# Patient Record
Sex: MALE | Race: WHITE | NOT HISPANIC OR LATINO | Employment: FULL TIME | URBAN - METROPOLITAN AREA
[De-identification: names, ages, dates, MRNs, and addresses within clinical notes are randomized per-mention and may not be internally consistent; named-entity substitution may affect disease eponyms.]

---
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
## 2023-10-08 ENCOUNTER — HEALTH MAINTENANCE LETTER (OUTPATIENT)
Age: 18
End: 2023-10-08

## 2024-04-10 ENCOUNTER — OFFICE VISIT (OUTPATIENT)
Dept: FAMILY MEDICINE | Facility: CLINIC | Age: 19
End: 2024-04-10
Payer: COMMERCIAL

## 2024-04-10 VITALS
BODY MASS INDEX: 33.9 KG/M2 | HEIGHT: 67 IN | DIASTOLIC BLOOD PRESSURE: 80 MMHG | OXYGEN SATURATION: 98 % | RESPIRATION RATE: 16 BRPM | HEART RATE: 72 BPM | WEIGHT: 216 LBS | TEMPERATURE: 98.9 F | SYSTOLIC BLOOD PRESSURE: 120 MMHG

## 2024-04-10 DIAGNOSIS — Z79.899 TRANSGENDER MAN ON HORMONE THERAPY: Primary | ICD-10-CM

## 2024-04-10 DIAGNOSIS — F64.0 TRANSGENDER MAN ON HORMONE THERAPY: Primary | ICD-10-CM

## 2024-04-10 DIAGNOSIS — F41.9 ANXIETY: ICD-10-CM

## 2024-04-10 DIAGNOSIS — K21.00 GASTROESOPHAGEAL REFLUX DISEASE WITH ESOPHAGITIS, UNSPECIFIED WHETHER HEMORRHAGE: ICD-10-CM

## 2024-04-10 PROCEDURE — 90715 TDAP VACCINE 7 YRS/> IM: CPT | Performed by: FAMILY MEDICINE

## 2024-04-10 PROCEDURE — 90651 9VHPV VACCINE 2/3 DOSE IM: CPT | Performed by: FAMILY MEDICINE

## 2024-04-10 PROCEDURE — 90471 IMMUNIZATION ADMIN: CPT | Performed by: FAMILY MEDICINE

## 2024-04-10 PROCEDURE — 90472 IMMUNIZATION ADMIN EACH ADD: CPT | Performed by: FAMILY MEDICINE

## 2024-04-10 PROCEDURE — 90746 HEPB VACCINE 3 DOSE ADULT IM: CPT | Performed by: FAMILY MEDICINE

## 2024-04-10 PROCEDURE — 99204 OFFICE O/P NEW MOD 45 MIN: CPT | Mod: 25 | Performed by: FAMILY MEDICINE

## 2024-04-10 RX ORDER — ESCITALOPRAM OXALATE 10 MG/1
10 TABLET ORAL DAILY
Qty: 30 TABLET | Refills: 2 | Status: SHIPPED | OUTPATIENT
Start: 2024-04-10 | End: 2024-08-19

## 2024-04-10 ASSESSMENT — ANXIETY QUESTIONNAIRES
8. IF YOU CHECKED OFF ANY PROBLEMS, HOW DIFFICULT HAVE THESE MADE IT FOR YOU TO DO YOUR WORK, TAKE CARE OF THINGS AT HOME, OR GET ALONG WITH OTHER PEOPLE?: VERY DIFFICULT
2. NOT BEING ABLE TO STOP OR CONTROL WORRYING: NEARLY EVERY DAY
6. BECOMING EASILY ANNOYED OR IRRITABLE: NEARLY EVERY DAY
7. FEELING AFRAID AS IF SOMETHING AWFUL MIGHT HAPPEN: SEVERAL DAYS
GAD7 TOTAL SCORE: 19
GAD7 TOTAL SCORE: 19
7. FEELING AFRAID AS IF SOMETHING AWFUL MIGHT HAPPEN: SEVERAL DAYS
5. BEING SO RESTLESS THAT IT IS HARD TO SIT STILL: NEARLY EVERY DAY
4. TROUBLE RELAXING: NEARLY EVERY DAY
3. WORRYING TOO MUCH ABOUT DIFFERENT THINGS: NEARLY EVERY DAY
IF YOU CHECKED OFF ANY PROBLEMS ON THIS QUESTIONNAIRE, HOW DIFFICULT HAVE THESE PROBLEMS MADE IT FOR YOU TO DO YOUR WORK, TAKE CARE OF THINGS AT HOME, OR GET ALONG WITH OTHER PEOPLE: VERY DIFFICULT
GAD7 TOTAL SCORE: 19
1. FEELING NERVOUS, ANXIOUS, OR ON EDGE: NEARLY EVERY DAY

## 2024-04-10 ASSESSMENT — PAIN SCALES - GENERAL: PAINLEVEL: NO PAIN (1)

## 2024-04-10 ASSESSMENT — PATIENT HEALTH QUESTIONNAIRE - PHQ9
SUM OF ALL RESPONSES TO PHQ QUESTIONS 1-9: 23
SUM OF ALL RESPONSES TO PHQ QUESTIONS 1-9: 23
10. IF YOU CHECKED OFF ANY PROBLEMS, HOW DIFFICULT HAVE THESE PROBLEMS MADE IT FOR YOU TO DO YOUR WORK, TAKE CARE OF THINGS AT HOME, OR GET ALONG WITH OTHER PEOPLE: EXTREMELY DIFFICULT

## 2024-04-10 NOTE — PROGRESS NOTES
"S ;Jose Washington is a 19 year old male with anxiety.  Worse recently.  Abd pain, nausea, legs hurt, stressed.  Not on meds, never has been.      Can't eat without nausea.  Worse recently.  No vomiting.      No fever, no r alba, not ill    Sleeping more.  No alcohol or drug use    Some gerd, burning with eating, not a lot.  Not on antacids    Transgender man: on testosterone since 12.  Has had breasts removed    O:/80   Pulse 72   Temp 98.9  F (37.2  C) (Tympanic)   Resp 16   Ht 1.702 m (5' 7\")   Wt 98 kg (216 lb)   SpO2 98%   BMI 33.83 kg/m    GEN: Alert and oriented, in no acute distress  CV: RRR, no murmur  RESP: lungs clear bilaterally, good effort  ABD: nontender.  No distention or mass appreciated.  EXT: no edema or lesions noted in lower extremities    A: transgender man, ongoing hormone therapy       Gerd, mild/moderate         Anxiety, severe    P: is getting into therapy.  Willing/wanting to start meds.  Lexapro 10mg.  Back in4-6 wks    Prilosec for gerd.  May be related to anxiety, but will cover sx at this point.    Continue testosterone    Self cares discussed.  Hobbies, diet/ exercise, social connection, sleep.  He agrees        "

## 2024-07-24 ENCOUNTER — TELEPHONE (OUTPATIENT)
Dept: FAMILY MEDICINE | Facility: CLINIC | Age: 19
End: 2024-07-24
Payer: COMMERCIAL

## 2024-07-24 NOTE — TELEPHONE ENCOUNTER
"RN COVID TREATMENT VISIT  07/24/24       The patient has been triaged and does not require a higher level of care.    Jose Washington  19 year old  Current weight? 216 lb    Has the patient been seen by a primary care provider at an [facility] or [facility] within the past two years? Yes.  Pt says Dr Meeks [facility], is PCP but that pt wants to establish at [facility] in future.  Have you been in close proximity to/do you have a known exposure to a person with a confirmed case of influenza? No.     General treatment eligibility:  Date of positive COVID test (PCR or at home)?  7/22/2024    Are you or have you been hospitalized for this COVID-19 infection? No.   Have you received monoclonal antibodies or antiviral treatment for COVID-19 since this positive test? No.   Do you have any of the following conditions that place you at risk of being very sick from COVID-19?   No high risk conditions. Patient informed they do not qualify for treatment in RN protocol    Onset of symptoms was 6 days ago on 7/18/24.    Pt reports that pt has had Covid 5 times before but is worried because pt feels weaker, brain fog, confusion, and not had these symptoms before.  Pt says if walks across the room, needs to stop and rest.  Pt says is concerned because this time the illness feels \"a lot worse\" than prior Covid illnesses.  Pt says heart feels like it is beating fast and has slight chest pain.    Pt reports drinking plenty of fluids.    Pt wants to be evaluated in person in urgent care or clinic today.    Pt intends to present in urgent care today.  Reminded pt to mask upon arrival and notify staff of positive Covid test.  Pt agrees.    Ese Glover RN       "

## 2024-08-19 ENCOUNTER — HOSPITAL ENCOUNTER (OUTPATIENT)
Dept: GENERAL RADIOLOGY | Facility: CLINIC | Age: 19
Discharge: HOME | End: 2024-08-19
Attending: NURSE PRACTITIONER | Admitting: NURSE PRACTITIONER
Payer: COMMERCIAL

## 2024-08-19 ENCOUNTER — OFFICE VISIT (OUTPATIENT)
Dept: FAMILY MEDICINE | Facility: CLINIC | Age: 19
End: 2024-08-19
Payer: COMMERCIAL

## 2024-08-19 VITALS
RESPIRATION RATE: 20 BRPM | BODY MASS INDEX: 35.63 KG/M2 | OXYGEN SATURATION: 100 % | SYSTOLIC BLOOD PRESSURE: 128 MMHG | DIASTOLIC BLOOD PRESSURE: 81 MMHG | HEIGHT: 67 IN | WEIGHT: 227 LBS | HEART RATE: 99 BPM | TEMPERATURE: 98.9 F

## 2024-08-19 DIAGNOSIS — R25.1 SHAKINESS: ICD-10-CM

## 2024-08-19 DIAGNOSIS — R41.89 BRAIN FOG: ICD-10-CM

## 2024-08-19 DIAGNOSIS — R06.02 SOB (SHORTNESS OF BREATH) ON EXERTION: ICD-10-CM

## 2024-08-19 DIAGNOSIS — F33.0 MILD EPISODE OF RECURRENT MAJOR DEPRESSIVE DISORDER (H): Primary | ICD-10-CM

## 2024-08-19 DIAGNOSIS — F41.1 GENERALIZED ANXIETY DISORDER: ICD-10-CM

## 2024-08-19 LAB
ALBUMIN SERPL BCG-MCNC: 4.7 G/DL (ref 3.5–5.2)
ALBUMIN UR-MCNC: NEGATIVE MG/DL
ALP SERPL-CCNC: 76 U/L (ref 40–150)
ALT SERPL W P-5'-P-CCNC: 15 U/L (ref 0–70)
AMORPH CRY #/AREA URNS HPF: ABNORMAL /HPF
ANION GAP SERPL CALCULATED.3IONS-SCNC: 11 MMOL/L (ref 7–15)
APPEARANCE UR: ABNORMAL
AST SERPL W P-5'-P-CCNC: 21 U/L (ref 0–45)
BACTERIA #/AREA URNS HPF: ABNORMAL /HPF
BILIRUB SERPL-MCNC: 0.4 MG/DL
BILIRUB UR QL STRIP: NEGATIVE
BUN SERPL-MCNC: 10.7 MG/DL (ref 6–20)
CALCIUM SERPL-MCNC: 9.5 MG/DL (ref 8.8–10.4)
CHLORIDE SERPL-SCNC: 103 MMOL/L (ref 98–107)
COLOR UR AUTO: YELLOW
CREAT SERPL-MCNC: 0.83 MG/DL (ref 0.67–1.17)
EGFRCR SERPLBLD CKD-EPI 2021: >90 ML/MIN/1.73M2
ERYTHROCYTE [DISTWIDTH] IN BLOOD BY AUTOMATED COUNT: 12 % (ref 10–15)
GLUCOSE SERPL-MCNC: 85 MG/DL (ref 70–99)
GLUCOSE UR STRIP-MCNC: NEGATIVE MG/DL
HCO3 SERPL-SCNC: 26 MMOL/L (ref 22–29)
HCT VFR BLD AUTO: 40.2 % (ref 40–53)
HGB BLD-MCNC: 13.2 G/DL (ref 13.3–17.7)
HGB UR QL STRIP: ABNORMAL
KETONES UR STRIP-MCNC: NEGATIVE MG/DL
LEUKOCYTE ESTERASE UR QL STRIP: NEGATIVE
MCH RBC QN AUTO: 28.5 PG (ref 26.5–33)
MCHC RBC AUTO-ENTMCNC: 32.8 G/DL (ref 31.5–36.5)
MCV RBC AUTO: 87 FL (ref 78–100)
MONOCYTES NFR BLD AUTO: NEGATIVE %
MUCOUS THREADS #/AREA URNS LPF: PRESENT /LPF
NITRATE UR QL: NEGATIVE
PH UR STRIP: 7.5 [PH] (ref 5–7)
PLATELET # BLD AUTO: 324 10E3/UL (ref 150–450)
POTASSIUM SERPL-SCNC: 3.8 MMOL/L (ref 3.4–5.3)
PROT SERPL-MCNC: 7.7 G/DL (ref 6.4–8.3)
RBC # BLD AUTO: 4.63 10E6/UL (ref 4.4–5.9)
RBC #/AREA URNS AUTO: ABNORMAL /HPF
SODIUM SERPL-SCNC: 140 MMOL/L (ref 135–145)
SP GR UR STRIP: 1.01 (ref 1–1.03)
SQUAMOUS #/AREA URNS AUTO: ABNORMAL /LPF
TSH SERPL DL<=0.005 MIU/L-ACNC: 1.27 UIU/ML (ref 0.3–4.2)
UROBILINOGEN UR STRIP-ACNC: 0.2 E.U./DL
WBC # BLD AUTO: 9.1 10E3/UL (ref 4–11)
WBC #/AREA URNS AUTO: ABNORMAL /HPF

## 2024-08-19 PROCEDURE — 84443 ASSAY THYROID STIM HORMONE: CPT | Performed by: NURSE PRACTITIONER

## 2024-08-19 PROCEDURE — 86308 HETEROPHILE ANTIBODY SCREEN: CPT | Performed by: NURSE PRACTITIONER

## 2024-08-19 PROCEDURE — 96127 BRIEF EMOTIONAL/BEHAV ASSMT: CPT | Performed by: NURSE PRACTITIONER

## 2024-08-19 PROCEDURE — 36415 COLL VENOUS BLD VENIPUNCTURE: CPT | Performed by: NURSE PRACTITIONER

## 2024-08-19 PROCEDURE — 81001 URINALYSIS AUTO W/SCOPE: CPT | Performed by: NURSE PRACTITIONER

## 2024-08-19 PROCEDURE — 71046 X-RAY EXAM CHEST 2 VIEWS: CPT

## 2024-08-19 PROCEDURE — 85027 COMPLETE CBC AUTOMATED: CPT | Performed by: NURSE PRACTITIONER

## 2024-08-19 PROCEDURE — 80053 COMPREHEN METABOLIC PANEL: CPT | Performed by: NURSE PRACTITIONER

## 2024-08-19 PROCEDURE — 86618 LYME DISEASE ANTIBODY: CPT | Performed by: NURSE PRACTITIONER

## 2024-08-19 PROCEDURE — G2211 COMPLEX E/M VISIT ADD ON: HCPCS | Performed by: NURSE PRACTITIONER

## 2024-08-19 PROCEDURE — 93000 ELECTROCARDIOGRAM COMPLETE: CPT | Performed by: NURSE PRACTITIONER

## 2024-08-19 PROCEDURE — 99214 OFFICE O/P EST MOD 30 MIN: CPT | Performed by: NURSE PRACTITIONER

## 2024-08-19 RX ORDER — ESCITALOPRAM OXALATE 20 MG/1
20 TABLET ORAL DAILY
Qty: 90 TABLET | Refills: 1 | Status: SHIPPED | OUTPATIENT
Start: 2024-08-19

## 2024-08-19 RX ORDER — TESTOSTERONE ENANTHATE 75 MG/.5ML
75 INJECTION SUBCUTANEOUS WEEKLY
COMMUNITY
Start: 2023-10-02

## 2024-08-19 ASSESSMENT — PATIENT HEALTH QUESTIONNAIRE - PHQ9
SUM OF ALL RESPONSES TO PHQ QUESTIONS 1-9: 10
5. POOR APPETITE OR OVEREATING: SEVERAL DAYS

## 2024-08-19 ASSESSMENT — PAIN SCALES - GENERAL: PAINLEVEL: MODERATE PAIN (5)

## 2024-08-19 ASSESSMENT — ANXIETY QUESTIONNAIRES
7. FEELING AFRAID AS IF SOMETHING AWFUL MIGHT HAPPEN: NOT AT ALL
6. BECOMING EASILY ANNOYED OR IRRITABLE: SEVERAL DAYS
GAD7 TOTAL SCORE: 3
3. WORRYING TOO MUCH ABOUT DIFFERENT THINGS: NOT AT ALL
1. FEELING NERVOUS, ANXIOUS, OR ON EDGE: SEVERAL DAYS
5. BEING SO RESTLESS THAT IT IS HARD TO SIT STILL: NOT AT ALL
2. NOT BEING ABLE TO STOP OR CONTROL WORRYING: NOT AT ALL
GAD7 TOTAL SCORE: 3

## 2024-08-19 ASSESSMENT — ENCOUNTER SYMPTOMS: FATIGUE: 1

## 2024-08-19 NOTE — PATIENT INSTRUCTIONS
I increased your lexapro to 20 mg.  Follow-up in 6 months if things seem to improve for recheck or sooner if still having mood issues with depression and anxiety.  Labs today.  EKG today.  Chest XR today.  I will notify you of Chest XR and lab results when the reports are back.  If this is all normal, I will refer you to [facility].

## 2024-08-19 NOTE — PROGRESS NOTES
Assessment & Plan     Mild episode of recurrent major depressive disorder (H24)  Due to still having some mild depression, will increase Lexapro to 20 mg.  I advised him to follow-up in 1 month or less if any side effects or not seeing improvement.  Otherwise I refilled this out for 6 months and recommend follow-up  - escitalopram (LEXAPRO) 20 MG tablet; Take 1 tablet (20 mg) by mouth daily    Generalized anxiety disorder  Currently stable.  - escitalopram (LEXAPRO) 20 MG tablet; Take 1 tablet (20 mg) by mouth daily    Brain fog  Uncertain if symptoms of shortness of breath, shakiness and brain fog are long-term COVID.  Will get labs today for CBC, TSH, CMP and urine as well as mononucleosis and Lyme's disease to rule out other causes.  If tests all come back negative would plan for referral to COVID long-haul her clinic for further evaluation.  I do not feel GI symptoms correlate to a long haulers diagnosis so I have recommend that he continue to follow-up with GI on the symptoms.  - CBC with platelets; Future  - TSH with free T4 reflex; Future  - Comprehensive metabolic panel (BMP + Alb, Alk Phos, ALT, AST, Total. Bili, TP); Future  - UA Macroscopic with reflex to Microscopic and Culture - Lab Collect; Future  - Mononucleosis screen; Future  - CBC with platelets  - TSH with free T4 reflex  - Comprehensive metabolic panel (BMP + Alb, Alk Phos, ALT, AST, Total. Bili, TP)  - UA Macroscopic with reflex to Microscopic and Culture - Lab Collect  - Mononucleosis screen    SOB (shortness of breath) on exertion  See note above.  EKG completed and normal.  - CBC with platelets; Future  - TSH with free T4 reflex; Future  - Comprehensive metabolic panel (BMP + Alb, Alk Phos, ALT, AST, Total. Bili, TP); Future  - XR Chest 2 Views; Future  - Mononucleosis screen; Future  - EKG 12-lead complete w/read - Clinics  - CBC with platelets  - TSH with free T4 reflex  - Comprehensive metabolic panel (BMP + Alb, Alk Phos, ALT, AST,  Total. Bili, TP)  - Mononucleosis screen    Shakiness  See note above.  - CBC with platelets; Future  - TSH with free T4 reflex; Future  - Comprehensive metabolic panel (BMP + Alb, Alk Phos, ALT, AST, Total. Bili, TP); Future  - Mononucleosis screen; Future  - CBC with platelets  - TSH with free T4 reflex  - Comprehensive metabolic panel (BMP + Alb, Alk Phos, ALT, AST, Total. Bili, TP)  - Mononucleosis screen        Depression Screening Follow Up        8/19/2024     2:23 PM   PHQ   PHQ-9 Total Score 10   Q9: Thoughts of better off dead/self-harm past 2 weeks Not at all         6/17/2021     4:00 PM 4/10/2024     7:19 AM 8/19/2024     2:23 PM   YAZMIN-7 SCORE   Total Score  19 (severe anxiety)    Total Score 2 19 3             Follow Up Actions Taken  Crisis resource information provided in After Visit Summary  Adjusted patient's anti-depressant medication.       See Patient Instructions    Jessa Garcia is a 19 year old, presenting for the following health issues:  Fatigue (For a year Following COVID/)        8/19/2024     2:11 PM   Additional Questions   Roomed by Rae MCCRARY LPN   Accompanied by Self     History of Present Illness       Reason for visit:  Variety of symptoms, establish care  Symptom onset:  More than a month  Symptoms include:  Shaking legs, overall weakness and fatigue,shortness of breath  Symptom intensity:  Severe  Symptom progression:  Worsening  Had these symptoms before:  Yes  Has tried/received treatment for these symptoms:  No  What makes it worse:  Working, most activites  What makes it better:  Lots of rest    He eats 2-3 servings of fruits and vegetables daily.He consumes 2 sweetened beverage(s) daily.He exercises with enough effort to increase his heart rate 20 to 29 minutes per day.  He exercises with enough effort to increase his heart rate 3 or less days per week.   He is taking medications regularly.     Patient states that he has had COVID about 5 times.  He did get the first 2  vaccinations but not any boosters.  This all started about 2 years ago and every time he gets COVID his symptoms seem to be progressively worse or hang around longer.  He states that he usually bounces back after couple weeks but since July he feels that his symptoms are not bouncing back.  He feels shaky especially when laying in bed.  He is get some brain fog and it is hard to focus.  He does have some shortness of breath with exertion sometimes but states that it is not always.  He has also had some stomach issues that have been followed up on and he was put on Prilosec which has not improved the symptoms.  He describes the symptoms as nausea with very little vomiting and feeling like it is hard to eat full meals and has to eat small amounts more often.  He will get occasional heartburn and has been on a PPI medication but this did not help.  He has tried gluten and dairy free for 2 weeks and this did not help the symptoms.  He is followed up with gastroenterology and had EGD completed which was normal.    Patient did see one of the providers in our breast clinic and he was started on Lexapro.  I repeated YAZMIN-7 and PHQ-9 today.  Anxiety does seem to be under control, however he does still have some mild depression.  He is currently on Lexapro 10 mg.    He also is transgender and is on hormone therapy testosterone.  He is tolerating this fine and is followed by specialist for this treatment plan.  His testosterone levels have been within normal expected range.        Review of Systems  CONSTITUTIONAL:POSITIVE  for intermittent shakiness, brain fog  RESP: NEGATIVE for significant cough, positive for intermittent shortness of breath  CV: NEGATIVE for chest pain, palpitations or peripheral edema  GI: Positive for nausea, rare vomiting, early satiety and heartburn, following with GI.  PSYCHIATRIC: POSITIVE for improved anxiety on Lexapro 10 mg with continued depression.  ROS otherwise negative      Objective    BP  "128/81   Pulse 99   Temp 98.9  F (37.2  C) (Tympanic)   Resp 20   Ht 1.702 m (5' 7\")   Wt 103 kg (227 lb)   SpO2 100%   BMI 35.55 kg/m    Body mass index is 35.55 kg/m .  Physical Exam   GENERAL: alert and no distress  RESP: lungs clear to auscultation - no rales, rhonchi or wheezes  CV: regular rate and rhythm, normal S1 S2, no S3 or S4, no murmur, click or rub, no peripheral edema  ABDOMEN: soft, nontender, no hepatosplenomegaly, no masses and bowel sounds normal  PSYCH: mentation appears normal, affect normal/bright  PSYCH: mentation appears normal and affect normal/bright    Signed Electronically by: Vani Leiva NP    "

## 2024-08-20 DIAGNOSIS — U09.9 COVID-19 LONG HAULER: Primary | ICD-10-CM

## 2024-08-20 LAB — B BURGDOR IGG+IGM SER QL: 0.17

## 2024-09-04 ENCOUNTER — VIRTUAL VISIT (OUTPATIENT)
Dept: PHYSICAL MEDICINE AND REHAB | Facility: CLINIC | Age: 19
End: 2024-09-04
Attending: NURSE PRACTITIONER
Payer: COMMERCIAL

## 2024-09-04 VITALS — BODY MASS INDEX: 34.55 KG/M2 | WEIGHT: 215 LBS | HEIGHT: 66 IN

## 2024-09-04 DIAGNOSIS — U09.9 COVID-19 LONG HAULER: ICD-10-CM

## 2024-09-04 DIAGNOSIS — F64.0 TRANSGENDER MAN ON HORMONE THERAPY: ICD-10-CM

## 2024-09-04 DIAGNOSIS — Z79.899 TRANSGENDER MAN ON HORMONE THERAPY: ICD-10-CM

## 2024-09-04 DIAGNOSIS — F41.9 ANXIETY: Primary | ICD-10-CM

## 2024-09-04 DIAGNOSIS — F32.1 CURRENT MODERATE EPISODE OF MAJOR DEPRESSIVE DISORDER, UNSPECIFIED WHETHER RECURRENT (H): ICD-10-CM

## 2024-09-04 PROCEDURE — 99204 OFFICE O/P NEW MOD 45 MIN: CPT | Mod: 95 | Performed by: PHYSICAL MEDICINE & REHABILITATION

## 2024-09-04 RX ORDER — BUPROPION HYDROCHLORIDE 150 MG/1
150 TABLET ORAL EVERY MORNING
Qty: 30 TABLET | Refills: 3 | Status: SHIPPED | OUTPATIENT
Start: 2024-09-04

## 2024-09-04 SDOH — SOCIAL STABILITY: SOCIAL NETWORK: I HAVE TROUBLE DOING ALL OF THE ACTIVITIES WITH FRIENDS THAT I WANT TO DO: ALWAYS

## 2024-09-04 SDOH — SOCIAL STABILITY: SOCIAL NETWORK: I HAVE TROUBLE DOING ALL OF THE FAMILY ACTIVITIES THAT I WANT TO DO: ALWAYS

## 2024-09-04 SDOH — SOCIAL STABILITY: SOCIAL NETWORK

## 2024-09-04 SDOH — SOCIAL STABILITY: SOCIAL NETWORK: I HAVE TROUBLE DOING ALL OF MY REGULAR LEISURE ACTIVITIES WITH OTHERS: ALWAYS

## 2024-09-04 SDOH — SOCIAL STABILITY: SOCIAL NETWORK: I HAVE TROUBLE DOING ALL OF MY USUAL WORK (INCLUDE WORK AT HOME): ALWAYS

## 2024-09-04 SDOH — SOCIAL STABILITY: SOCIAL NETWORK: PROMIS ABILITY TO PARTICIPATE IN SOCIAL ROLES & ACTIVITIES T-SCORE: 29

## 2024-09-04 ASSESSMENT — PATIENT HEALTH QUESTIONNAIRE - PHQ9
SUM OF ALL RESPONSES TO PHQ QUESTIONS 1-9: 10
SUM OF ALL RESPONSES TO PHQ QUESTIONS 1-9: 10
10. IF YOU CHECKED OFF ANY PROBLEMS, HOW DIFFICULT HAVE THESE PROBLEMS MADE IT FOR YOU TO DO YOUR WORK, TAKE CARE OF THINGS AT HOME, OR GET ALONG WITH OTHER PEOPLE: VERY DIFFICULT

## 2024-09-04 ASSESSMENT — ANXIETY QUESTIONNAIRES
GAD7 TOTAL SCORE: 8
7. FEELING AFRAID AS IF SOMETHING AWFUL MIGHT HAPPEN: NOT AT ALL
GAD7 TOTAL SCORE: 8
GAD7 TOTAL SCORE: 8
8. IF YOU CHECKED OFF ANY PROBLEMS, HOW DIFFICULT HAVE THESE MADE IT FOR YOU TO DO YOUR WORK, TAKE CARE OF THINGS AT HOME, OR GET ALONG WITH OTHER PEOPLE?: VERY DIFFICULT

## 2024-09-04 ASSESSMENT — PAIN SCALES - GENERAL: PAINLEVEL: MODERATE PAIN (4)

## 2024-09-04 NOTE — LETTER
9/4/2024       RE: Jose Washington  [address]  [place] [address]     Dear Colleague,    Thank you for referring your patient, Jose Washington, to the [facility] [place] [facility]. Please see a copy of my visit note below.    Jose is a 19 year old who is being evaluated via a billable video visit.    How would you like to obtain your AVS? MyChart  If the video visit is dropped, the invitation should be resent by: Send to e-mail at: [email or web address]  Will anyone else be joining your video visit? Notoday, to evaluate inflammatory levels.       Assessment and plan   Jose Washington is a 19 year old with history of LC since March 2022, presents with symptoms of fatigue, tachycardia, and SOB.   Additionally he has brain fog.     Send lab work today, to evaluate inflammatory levels.     Mental health;   Notes depression and anxiety not well controlled. Refer to psychiatry. Continue lexapro but will add Wellbutrin Prescription sent. He was also referred to psychiatry and psychology.     Consider guanfacine and NAC following blood work results.     Counseled patient on Long COVID syndrome, symptoms and treatment options. Will see the patient in 4 weeks to review the results of the blood work.     Serena Coronel MD, [facility]   Department of Rehabilitation         Subjective  Jose is a 19 year old, presenting for the following health issues:  Consult  Consult    HPI   History of COVID-19 infection: 6-7 times since the pandemic, last one in July 2024   Date of first symptoms:   He has had LC symptoms since March 2022.   Diagnosis: PCR  Hospitalization: No  Treatment: none   PMH;   Transgender man: on testosterone since 12. Has had breasts removed   Anxiety     Current Symptoms:  Fatigue; 7-8/10 grades, lower range is 2/10. Every activity makes the fatigue worse.      SOB; worsened by minimal exertion.      Tachycardia;  he has palpitations, and is not associated with activity.     He is on lexapro at 20 mg daily for depression and anxiety.     He is a  at [employer].     Labs reviewed   Hemoglobin is somewhat low for his age, and MCV is towards the lower end of the norm.   Lyme disease antibody serum is negative     CHEST TWO VIEWS 8/19/2024 3:15 PM   HISTORY: SOB (shortness of breath) on exertion  COMPARISON: None.                                                                IMPRESSION: The cardiac silhouette is within normal limits. No  evidence for infiltrate or effusion. No significant bony  abnormalities.        9/4/2024     8:32 AM   PHQ Assesment Total Score(s)   PHQ-9 Score 10           9/4/2024     8:33 AM   YAZMIN-7 Results   YAZMIN 7 TOTAL SCORE 8 (mild anxiety)   YAZMIN-7 Total Score 8         9/4/2024     8:33 AM   PTSD Screen Score   Have you ever experienced this kind of event? No         9/4/2024     8:35 AM   PROMIS-29   PROMIS Physical Function T-Score 36 (moderate dysfunction)   PROMIS Anxiety T-Score 60 (mild)   PROMIS Depression T-Score 56 (mild)   PROMIS Fatigue T-Score 76 (severe)   PROMIS Sleep Disturbance T-Score 54 (within normal limits)   PROMIS Ability to Participate in Social Roles & Activities T-Score 29 (severe dysfunction)   PROMIS Pain Interference T-Score 76 (severe)   PROMIS Pain Intensity 6           Social History     Tobacco Use     Smoking status: Never     Smokeless tobacco: Never   Vaping Use     Vaping status: Never Used         Current Outpatient Medications:      escitalopram (LEXAPRO) 20 MG tablet, Take 1 tablet (20 mg) by mouth daily, Disp: 90 tablet, Rfl: 1     XYOSTED 75 MG/0.5ML SOAJ, Inject 75 mg as directed once a week, Disp: , Rfl:       Review of Systems  Constitutional, HEENT, cardiovascular, pulmonary, gi and gu systems are negative, except as otherwise noted.      Objective   Vitals - Patient Reported  Pain Score: Moderate Pain (4)  Pain Loc:  (lower  body)        Physical Exam   GENERAL: alert and no distress  EYES: Eyes grossly normal to inspection.  No discharge or erythema, or obvious scleral/conjunctival abnormalities.  RESP: No audible wheeze, cough, or visible cyanosis.    SKIN: Visible skin clear. No significant rash, abnormal pigmentation or lesions.  NEURO: Cranial nerves grossly intact.  Mentation and speech appropriate for age.  PSYCH: Appropriate affect, tone, and pace of words          Video-Visit Details    Type of service:  Video Visit   Originating Location (pt. Location): Home    Distant Location (provider location):  Off-site  Platform used for Video Visit: [facility]  Signed Electronically by: Serena Coronel MD    Answers submitted by the patient for this visit:  Patient Health Questionnaire (Submitted on 9/4/2024)  If you checked off any problems, how difficult have these problems made it for you to do your work, take care of things at home, or get along with other people?: Very difficult  PHQ9 TOTAL SCORE: 10  Patient Health Questionnaire (G7) (Submitted on 9/4/2024)  YAZMIN 7 TOTAL SCORE: 8      Again, thank you for allowing me to participate in the care of your patient.      Sincerely,    Serena Coronel MD

## 2024-09-04 NOTE — PROGRESS NOTES
Jose is a 19 year old who is being evaluated via a billable video visit.    How would you like to obtain your AVS? MyChart  If the video visit is dropped, the invitation should be resent by: Send to e-mail at: [email or web address]  Will anyone else be joining your video visit? Notoday, to evaluate inflammatory levels.       Assessment and plan   Jose Washington is a 19 year old with history of LC since March 2022, presents with symptoms of fatigue, tachycardia, and SOB.   Additionally he has brain fog.     Send lab work today, to evaluate inflammatory levels.     Mental health;   Notes depression and anxiety not well controlled. Refer to psychiatry. Continue lexapro but will add Wellbutrin Prescription sent. He was also referred to psychiatry and psychology.     Consider guanfacine and NAC following blood work results.     Counseled patient on Long COVID syndrome, symptoms and treatment options. Will see the patient in 4 weeks to review the results of the blood work.     Serena Coronel MD, [facility]   Department of Rehabilitation         Subjective   Jose is a 19 year old, presenting for the following health issues:  Consult  Consult    HPI   History of COVID-19 infection: 6-7 times since the pandemic, last one in July 2024   Date of first symptoms:   He has had LC symptoms since March 2022.   Diagnosis: PCR  Hospitalization: No  Treatment: none   PMH;   Transgender man: on testosterone since 12. Has had breasts removed   Anxiety     Current Symptoms:  Fatigue; 7-8/10 grades, lower range is 2/10. Every activity makes the fatigue worse.      SOB; worsened by minimal exertion.     Tachycardia;  he has palpitations, and is not associated with activity.     He is on lexapro at 20 mg daily for depression and anxiety.     He is a  at [employer].     Labs reviewed   Hemoglobin is somewhat low for his age, and MCV is towards the lower end of the norm.   Lyme disease antibody serum is negative     CHEST TWO  VIEWS 8/19/2024 3:15 PM   HISTORY: SOB (shortness of breath) on exertion  COMPARISON: None.                                                                IMPRESSION: The cardiac silhouette is within normal limits. No  evidence for infiltrate or effusion. No significant bony  abnormalities.        9/4/2024     8:32 AM   PHQ Assesment Total Score(s)   PHQ-9 Score 10           9/4/2024     8:33 AM   YAZMIN-7 Results   YAZMIN 7 TOTAL SCORE 8 (mild anxiety)   YAZMIN-7 Total Score 8         9/4/2024     8:33 AM   PTSD Screen Score   Have you ever experienced this kind of event? No         9/4/2024     8:35 AM   PROMIS-29   PROMIS Physical Function T-Score 36 (moderate dysfunction)   PROMIS Anxiety T-Score 60 (mild)   PROMIS Depression T-Score 56 (mild)   PROMIS Fatigue T-Score 76 (severe)   PROMIS Sleep Disturbance T-Score 54 (within normal limits)   PROMIS Ability to Participate in Social Roles & Activities T-Score 29 (severe dysfunction)   PROMIS Pain Interference T-Score 76 (severe)   PROMIS Pain Intensity 6           Social History     Tobacco Use    Smoking status: Never    Smokeless tobacco: Never   Vaping Use    Vaping status: Never Used         Current Outpatient Medications:     escitalopram (LEXAPRO) 20 MG tablet, Take 1 tablet (20 mg) by mouth daily, Disp: 90 tablet, Rfl: 1    XYOSTED 75 MG/0.5ML SOAJ, Inject 75 mg as directed once a week, Disp: , Rfl:       Review of Systems  Constitutional, HEENT, cardiovascular, pulmonary, gi and gu systems are negative, except as otherwise noted.      Objective    Vitals - Patient Reported  Pain Score: Moderate Pain (4)  Pain Loc:  (lower body)        Physical Exam   GENERAL: alert and no distress  EYES: Eyes grossly normal to inspection.  No discharge or erythema, or obvious scleral/conjunctival abnormalities.  RESP: No audible wheeze, cough, or visible cyanosis.    SKIN: Visible skin clear. No significant rash, abnormal pigmentation or lesions.  NEURO: Cranial nerves grossly  intact.  Mentation and speech appropriate for age.  PSYCH: Appropriate affect, tone, and pace of words          Video-Visit Details    Type of service:  Video Visit   Originating Location (pt. Location): Home    Distant Location (provider location):  Off-site  Platform used for Video Visit: Jose  Signed Electronically by: Serena Coronel MD    Answers submitted by the patient for this visit:  Patient Health Questionnaire (Submitted on 9/4/2024)  If you checked off any problems, how difficult have these problems made it for you to do your work, take care of things at home, or get along with other people?: Very difficult  PHQ9 TOTAL SCORE: 10  Patient Health Questionnaire (G7) (Submitted on 9/4/2024)  YAZMIN 7 TOTAL SCORE: 8

## 2024-09-04 NOTE — NURSING NOTE
Current patient location:  [place], [place]    Is the patient currently in the state of [place]? YES    Visit mode:VIDEO    If the visit is dropped, the patient can be reconnected by: VIDEO VISIT: Send to e-mail at: [email or web address]    Will anyone else be joining the visit? NO  (If patient encounters technical issues they should call [phone]    How would you like to obtain your AVS? MyChart    Are changes needed to the allergy or medication list? No    Are refills needed on medications prescribed by this physician? NO    Rooming Documentation:  Questionnaire(s) completed      Reason for visit: Consult    Rae Zhou VVF    Depression Response    Patient completed the PHQ-9 assessment for depression and scored >9? Yes  Question 9 on the PHQ-9 was positive for suicidality? No  Does patient have current mental health provider? No    Is this a virtual visit? Yes   Does patient have suicidal ideation (positive question 9)? No - offer to place Mental Health Referral.  Referral order pended    I personally notified the following: visit provider

## 2024-09-13 ENCOUNTER — APPOINTMENT (OUTPATIENT)
Dept: LAB | Facility: CLINIC | Age: 19
End: 2024-09-13
Payer: COMMERCIAL

## 2024-09-13 LAB
CRP SERPL-MCNC: 8.4 MG/L
ERYTHROCYTE [SEDIMENTATION RATE] IN BLOOD BY WESTERGREN METHOD: 8 MM/HR (ref 0–15)
FERRITIN SERPL-MCNC: 144 NG/ML (ref 31–409)
FOLATE SERPL-MCNC: 8.4 NG/ML (ref 4.6–34.8)
IRON BINDING CAPACITY (ROCHE): 349 UG/DL (ref 240–430)
IRON SATN MFR SERPL: 27 % (ref 15–46)
IRON SERPL-MCNC: 94 UG/DL (ref 61–157)
LDH SERPL L TO P-CCNC: 168 U/L (ref 0–250)
VIT B12 SERPL-MCNC: 465 PG/ML (ref 232–1245)

## 2024-09-13 PROCEDURE — 83615 LACTATE (LD) (LDH) ENZYME: CPT | Performed by: PHYSICAL MEDICINE & REHABILITATION

## 2024-09-13 PROCEDURE — 36415 COLL VENOUS BLD VENIPUNCTURE: CPT | Performed by: PHYSICAL MEDICINE & REHABILITATION

## 2024-09-13 PROCEDURE — 83550 IRON BINDING TEST: CPT | Performed by: PHYSICAL MEDICINE & REHABILITATION

## 2024-09-13 PROCEDURE — 82746 ASSAY OF FOLIC ACID SERUM: CPT | Performed by: PHYSICAL MEDICINE & REHABILITATION

## 2024-09-13 PROCEDURE — 85652 RBC SED RATE AUTOMATED: CPT | Performed by: PHYSICAL MEDICINE & REHABILITATION

## 2024-09-13 PROCEDURE — 82728 ASSAY OF FERRITIN: CPT | Performed by: PHYSICAL MEDICINE & REHABILITATION

## 2024-09-13 PROCEDURE — 99000 SPECIMEN HANDLING OFFICE-LAB: CPT | Mod: 95 | Performed by: PATHOLOGY

## 2024-09-13 PROCEDURE — 86140 C-REACTIVE PROTEIN: CPT | Performed by: PHYSICAL MEDICINE & REHABILITATION

## 2024-09-13 PROCEDURE — 84425 ASSAY OF VITAMIN B-1: CPT | Mod: 90 | Performed by: PHYSICAL MEDICINE & REHABILITATION

## 2024-09-13 PROCEDURE — 82607 VITAMIN B-12: CPT | Performed by: PHYSICAL MEDICINE & REHABILITATION

## 2024-09-13 PROCEDURE — 84207 ASSAY OF VITAMIN B-6: CPT | Mod: 90 | Performed by: PHYSICAL MEDICINE & REHABILITATION

## 2024-09-17 LAB
PYRIDOXAL PHOS SERPL-SCNC: 84.3 NMOL/L
VIT B1 PYROPHOSHATE BLD-SCNC: 146 NMOL/L

## 2024-09-18 ENCOUNTER — TELEPHONE (OUTPATIENT)
Dept: PHYSICAL MEDICINE AND REHAB | Facility: CLINIC | Age: 19
End: 2024-09-18
Payer: COMMERCIAL

## 2024-10-02 ENCOUNTER — VIRTUAL VISIT (OUTPATIENT)
Dept: PHYSICAL MEDICINE AND REHAB | Facility: CLINIC | Age: 19
End: 2024-10-02
Payer: COMMERCIAL

## 2024-10-02 VITALS — WEIGHT: 220 LBS | BODY MASS INDEX: 35.51 KG/M2

## 2024-10-02 DIAGNOSIS — U09.9 COVID-19 LONG HAULER: Primary | ICD-10-CM

## 2024-10-02 PROCEDURE — 99214 OFFICE O/P EST MOD 30 MIN: CPT | Mod: 95 | Performed by: PHYSICAL MEDICINE & REHABILITATION

## 2024-10-02 RX ORDER — GUANFACINE 1 MG/1
1 TABLET ORAL AT BEDTIME
Qty: 30 TABLET | Refills: 3 | Status: SHIPPED | OUTPATIENT
Start: 2024-10-02

## 2024-10-02 SDOH — SOCIAL STABILITY: SOCIAL NETWORK: I HAVE TROUBLE DOING ALL OF MY REGULAR LEISURE ACTIVITIES WITH OTHERS: ALWAYS

## 2024-10-02 SDOH — SOCIAL STABILITY: SOCIAL NETWORK: I HAVE TROUBLE DOING ALL OF MY USUAL WORK (INCLUDE WORK AT HOME): ALWAYS

## 2024-10-02 SDOH — SOCIAL STABILITY: SOCIAL NETWORK: PROMIS ABILITY TO PARTICIPATE IN SOCIAL ROLES & ACTIVITIES T-SCORE: 29

## 2024-10-02 SDOH — SOCIAL STABILITY: SOCIAL NETWORK: I HAVE TROUBLE DOING ALL OF THE FAMILY ACTIVITIES THAT I WANT TO DO: ALWAYS

## 2024-10-02 SDOH — SOCIAL STABILITY: SOCIAL NETWORK

## 2024-10-02 SDOH — SOCIAL STABILITY: SOCIAL NETWORK: I HAVE TROUBLE DOING ALL OF THE ACTIVITIES WITH FRIENDS THAT I WANT TO DO: ALWAYS

## 2024-10-02 ASSESSMENT — ANXIETY QUESTIONNAIRES
8. IF YOU CHECKED OFF ANY PROBLEMS, HOW DIFFICULT HAVE THESE MADE IT FOR YOU TO DO YOUR WORK, TAKE CARE OF THINGS AT HOME, OR GET ALONG WITH OTHER PEOPLE?: VERY DIFFICULT
7. FEELING AFRAID AS IF SOMETHING AWFUL MIGHT HAPPEN: NOT AT ALL
2. NOT BEING ABLE TO STOP OR CONTROL WORRYING: NOT AT ALL
1. FEELING NERVOUS, ANXIOUS, OR ON EDGE: SEVERAL DAYS
GAD7 TOTAL SCORE: 9
4. TROUBLE RELAXING: NEARLY EVERY DAY
GAD7 TOTAL SCORE: 9
IF YOU CHECKED OFF ANY PROBLEMS ON THIS QUESTIONNAIRE, HOW DIFFICULT HAVE THESE PROBLEMS MADE IT FOR YOU TO DO YOUR WORK, TAKE CARE OF THINGS AT HOME, OR GET ALONG WITH OTHER PEOPLE: VERY DIFFICULT
5. BEING SO RESTLESS THAT IT IS HARD TO SIT STILL: NEARLY EVERY DAY
GAD7 TOTAL SCORE: 9
7. FEELING AFRAID AS IF SOMETHING AWFUL MIGHT HAPPEN: NOT AT ALL
3. WORRYING TOO MUCH ABOUT DIFFERENT THINGS: NOT AT ALL
6. BECOMING EASILY ANNOYED OR IRRITABLE: MORE THAN HALF THE DAYS

## 2024-10-02 ASSESSMENT — PATIENT HEALTH QUESTIONNAIRE - PHQ9
SUM OF ALL RESPONSES TO PHQ QUESTIONS 1-9: 15
SUM OF ALL RESPONSES TO PHQ QUESTIONS 1-9: 15
10. IF YOU CHECKED OFF ANY PROBLEMS, HOW DIFFICULT HAVE THESE PROBLEMS MADE IT FOR YOU TO DO YOUR WORK, TAKE CARE OF THINGS AT HOME, OR GET ALONG WITH OTHER PEOPLE: EXTREMELY DIFFICULT

## 2024-10-02 ASSESSMENT — PAIN SCALES - GENERAL: PAINLEVEL: SEVERE PAIN (6)

## 2024-10-02 NOTE — NURSING NOTE
Current patient location:  [place]    Is the patient currently in the state of [place]? NO    Visit mode:VIDEO    If the visit is dropped, the patient can be reconnected by: VIDEO VISIT: Text to cell phone:   Telephone Information:   Mobile [phone]       Will anyone else be joining the visit? NO  (If patient encounters technical issues they should call [phone]    How would you like to obtain your AVS? MyChart    Are changes needed to the allergy or medication list? No    Are refills needed on medications prescribed by this physician? NO    Reason for visit: RECHECK    Shira MUHAMMAD

## 2024-10-02 NOTE — LETTER
10/2/2024       RE: Jose Washington  [address]  [place] [address]     Dear Colleague,    Thank you for referring your patient, Jose Washington, to the [facility] PHYSICAL MEDICINE AND REHABILITATION CLINIC [place] [facility]. Please see a copy of my visit note below.    Jose is a 19 year old who is being evaluated via a billable video visit.    What phone number would you like to be contacted at? [phone]     How would you like to obtain your AVS? Felipehart          Subjective  Jose is a 19 year old, presenting for the following health issues:  RECHECK  RECHECK    Assessment and plan   Jose Washington is a 19 year old with history of LC since March 2022, presents with symptoms of fatigue, tachycardia, and SOB.   Additionally he has brain fog.      Send lab work today, to evaluate inflammatory levels.      Mental health;   Depression and anxiety much better today since starting Wellbutrin. He did get an appointment for Psychiatry in April 2025. Will benefit from increasing the dose of Wellbutrin to 300 mg ER.      Given that the CRP levels are high, will start guanfacine at 1 mg daily. CRP level recheck next visit.      Counseled patient on Long COVID syndrome, symptoms and treatment options. He is doing long hours at work and does not have time to exercise.     For the nutrition, counseled patient on anti-inflammatory diet for the next 6 weeks.    Will see the patient in 6 weeks in follow up to review the results of the blood work and response to guanfacine.       Serena Coronel MD, A   Department of Rehabilitation       HPI   History of COVID-19 infection: 6-7 times since the pandemic, last one in July 2024   Date of first symptoms:   He has had LC symptoms since March 2022.   Diagnosis: PCR  Hospitalization: No  Treatment: none   PMH;   Transgender man: on testosterone since 12. Has had breasts removed   Anxiety     Current  Symptoms:  Symptoms are fatigue, pain and difficulty in concentrating   Pain is worse in the legs and back. There is severe shaking of the legs which is not painful. They do get sore.   With regards to depression and anxiety, he states that ge is able to organize the thoughts a bit faster. He is on Wellbutrin instead of lexapro. He is on 150 mg ER       Latest Reference Range & Units Most Recent   CRP Inflammation <5.00 mg/L 8.40 (H)  9/13/24 07:27   Ferritin 31 - 409 ng/mL 144  9/13/24 07:27   Folate 4.6 - 34.8 ng/mL 8.4  9/13/24 07:27   Iron 61 - 157 ug/dL 94  9/13/24 07:27   Iron Binding Capacity 240 - 430 ug/dL 349  9/13/24 07:27   Iron Sat Index 15 - 46 % 27  9/13/24 07:27   Lactate Dehydrogenase 0 - 250 U/L 168  9/13/24 07:27   Vitamin B1 Whole Blood Level 70 - 180 nmol/L 146  9/13/24 07:27   Vitamin B12 232 - 1,245 pg/mL 465  9/13/24 07:27   Vitamin B6 20.0 - 125.0 nmol/L 84.3  9/13/24 07:27   Sed Rate 0 - 15 mm/hr 8  9/13/24 07:27   (H): Data is abnormally high        10/2/2024    10:08 AM   PHQ Assesment Total Score(s)   PHQ-9 Score 15           10/2/2024    10:11 AM   YAZMIN-7 Results   YAZMIN 7 TOTAL SCORE 9 (mild anxiety)   YAZMIN-7 Total Score 9         10/2/2024    10:11 AM   PTSD Screen Score   Have you ever experienced this kind of event? No         10/2/2024    10:14 AM   PROMIS-29   PROMIS Physical Function T-Score 32 (moderate dysfunction)   PROMIS Anxiety T-Score 51 (within normal limits)   PROMIS Depression T-Score 52 (within normal limits)   PROMIS Fatigue T-Score 76 (severe)   PROMIS Sleep Disturbance T-Score 54 (within normal limits)   PROMIS Ability to Participate in Social Roles & Activities T-Score 29 (severe dysfunction)   PROMIS Pain Interference T-Score 76 (severe)   PROMIS Pain Intensity 8           Social History     Tobacco Use     Smoking status: Never     Smokeless tobacco: Never   Vaping Use     Vaping status: Never Used         Current Outpatient Medications:      buPROPion (WELLBUTRIN  XL) 150 MG 24 hr tablet, Take 1 tablet (150 mg) by mouth every morning., Disp: 30 tablet, Rfl: 3     XYOSTED 75 MG/0.5ML SOAJ, Inject 75 mg as directed once a week, Disp: , Rfl:      escitalopram (LEXAPRO) 20 MG tablet, Take 1 tablet (20 mg) by mouth daily, Disp: 90 tablet, Rfl: 1      Review of Systems  Constitutional, HEENT, cardiovascular, pulmonary, gi and gu systems are negative, except as otherwise noted.      Objective   Vitals - Patient Reported  Pain Score: Severe Pain (6)  Pain Loc:  (lower body and back)        Physical Exam   GENERAL: alert and no distress  EYES: Eyes grossly normal to inspection.  No discharge or erythema, or obvious scleral/conjunctival abnormalities.  RESP: No audible wheeze, cough, or visible cyanosis.    SKIN: Visible skin clear. No significant rash, abnormal pigmentation or lesions.  NEURO: Cranial nerves grossly intact.  Mentation and speech appropriate for age.  PSYCH: Appropriate affect, tone, and pace of words    Virtual Visit on 09/04/2024   Component Date Value Ref Range Status     CRP Inflammation 09/13/2024 8.40 (H)  <5.00 mg/L Final     Erythrocyte Sedimentation Rate 09/13/2024 8  0 - 15 mm/hr Final     Ferritin 09/13/2024 144  31 - 409 ng/mL Final     Iron 09/13/2024 94  61 - 157 ug/dL Final     Iron Binding Capacity 09/13/2024 349  240 - 430 ug/dL Final     Iron Sat Index 09/13/2024 27  15 - 46 % Final     Lactate Dehydrogenase 09/13/2024 168  0 - 250 U/L Final     Vitamin B1 Whole Blood Level 09/13/2024 146  70 - 180 nmol/L Final    INTERPRETIVE INFORMATION: Vitamin B1, Whole Blood    This assay measures the concentration of thiamine   diphosphate (TDP), the primary active form of vitamin B1.   Approximately 90 percent of vitamin B1 present in whole   blood is TDP. Thiamine and thiamine monophosphate, which   comprise the remaining 10 percent, are not measured.    This test was developed and its performance characteristics   determined by [facility]. It has not  been cleared or   approved by the US Food and Drug Administration. This test   was performed in a CLIA certified laboratory and is   intended for clinical purposes.  Performed By: [facility]  [address] [place], [place] [id number]  : Alejandro Doyle MD, PhD  CLIA Number: [id number]     Vitamin B12 09/13/2024 465  232 - 1,245 pg/mL Final     Folic Acid 09/13/2024 8.4  4.6 - 34.8 ng/mL Final     Vitamin B6 09/13/2024 84.3  20.0 - 125.0 nmol/L Final    INTERPRETIVE INFORMATION: Vitamin B6 (Pyridoxal 5-Phosphate)    Pyridoxal 5'-phosphate measured in a specimen collected   following an 8-hour or overnight fast accurately indicates   vitamin B6 nutritional status. Non-fasting specimen   concentration reflects recent vitamin intake.    This test was developed and its performance characteristics   determined by [facility]. It has not been cleared or   approved by the US Food and Drug Administration. This test   was performed in a CLIA certified laboratory and is   intended for clinical purposes.  Performed By: [facility]  [address] [place] [address]  : Alejandro Doyle MD, PhD  CLIA Number: [id number]         Video-Visit Details    Type of service:  Video Visit   Originating Location (pt. Location): Home    Distant Location (provider location):  Off-site  Platform used for Video Visit: Jose  Signed Electronically by: Serena Coronel MD      PROMIS-29  Are you able to do chores such as vacuuming or yard work?  With much difficulty  Are you able to go up and down stairs at a normal pace?  With much difficulty  Are you able to go for a walk of at least 15 minutes?  With much difficulty  Are you able to run errands and shop?  With much difficulty  In the past 7 [place] felt fearful  Never  I found it hard to focus on anything other than my anxiety  Sometimes  My worries overwhelmed me  Never  I felt uneasy  Never  In the past 7 [place]  felt worthless  Never  I felt helpless  Never  I felt depressed  Sometimes  I felt hopeless  Never  During the past 7 days / In the past 7 [place] feel fatigued  Very much  I have trouble starting things because I am tired  Very much  How run-down did you feel on average?  Very much  How fatigued were you on average?  Very much  In the past 7 daysMy sleep quality was...  Poor  In the past 7 daysMy sleep was refreshing.  Somewhat  I had a problem with my sleep.  Quite a bit  I had difficulty falling asleep.  Not at all  I have trouble doing all of my regular leisure activities with others  Always  I have trouble doing all of the family activities that I want to do  Always  I have trouble doing all of my usual work (include work at home)  Always  I have trouble doing all of the activities with friends that I want to do  Always  In the past 7 daysHow much did pain interfere with your day to day activities?  Very much  How much did pain interfere with work around the home?  Very much  How much did pain interfere with your ability to participate in social activities?  Very much  How much did pain interfere with your household chores?  Very much  In the past 7 daysHow would you rate your pain on average?  8    PTSD SCREENER  Have you ever experienced this kind of event? NO    GAD7      The following questionnaire should only be answered by the patient. Are you the patient? Yes   YAZMIN-7(Used with permission: [employer] 2002)    Over the last two weeks, how often have you been bothered by the following problems?    1. Feeling nervous, anxious, or on edge Several days   2. Not being able to stop or control worrying Not at all   3. Worrying too much about different things Not at all   4. Trouble relaxing Nearly every day   5. Being so restless that it is hard to sit still Nearly every day   6. Becoming easily annoyed or irritable More than half the days   7. Feeling afraid, as if something awful might happen Not at all   If  you checked off any problems on this questionnaire, how difficult have these problems made it for you to do your work, take care of things at home, or get along with other people? Very difficult   YAZMIN-7(Used with permission: [employer] 2002)        Answers submitted by the patient for this visit:  Patient Health Questionnaire (Submitted on 10/2/2024)  If you checked off any problems, how difficult have these problems made it for you to do your work, take care of things at home, or get along with other people?: Extremely difficult  PHQ9 TOTAL SCORE: 15  Patient Health Questionnaire (G7) (Submitted on 10/2/2024)  YAZMIN 7 TOTAL SCORE: 9      Again, thank you for allowing me to participate in the care of your patient.      Sincerely,    Serena Coronel MD

## 2024-10-02 NOTE — PROGRESS NOTES
Jose is a 19 year old who is being evaluated via a billable video visit.    What phone number would you like to be contacted at? [phone]     How would you like to obtain your AVS? Freddy Germain   Jose is a 19 year old, presenting for the following health issues:  RECHECK  RECHECK    Assessment and plan   Jose Washington is a 19 year old with history of LC since March 2022, presents with symptoms of fatigue, tachycardia, and SOB.   Additionally he has brain fog.      Send lab work today, to evaluate inflammatory levels.      Mental health;   Depression and anxiety much better today since starting Wellbutrin. He did get an appointment for Psychiatry in April 2025. Will benefit from increasing the dose of Wellbutrin to 300 mg ER.      Given that the CRP levels are high, will start guanfacine at 1 mg daily. CRP level recheck next visit.      Counseled patient on Long COVID syndrome, symptoms and treatment options. He is doing long hours at work and does not have time to exercise.     For the nutrition, counseled patient on anti-inflammatory diet for the next 6 weeks.    Will see the patient in 6 weeks in follow up to review the results of the blood work and response to guanfacine.       Serena Coronel MD, [facility]   Department of Rehabilitation       HPI   History of COVID-19 infection: 6-7 times since the pandemic, last one in July 2024   Date of first symptoms:   He has had LC symptoms since March 2022.   Diagnosis: PCR  Hospitalization: No  Treatment: none   PMH;   Transgender man: on testosterone since 12. Has had breasts removed   Anxiety     Current Symptoms:  Symptoms are fatigue, pain and difficulty in concentrating   Pain is worse in the legs and back. There is severe shaking of the legs which is not painful. They do get sore.   With regards to depression and anxiety, he states that ge is able to organize the thoughts a bit faster. He is on Wellbutrin instead of lexapro. He is on 150 mg ER        Latest Reference Range & Units Most Recent   CRP Inflammation <5.00 mg/L 8.40 (H)  9/13/24 07:27   Ferritin 31 - 409 ng/mL 144  9/13/24 07:27   Folate 4.6 - 34.8 ng/mL 8.4  9/13/24 07:27   Iron 61 - 157 ug/dL 94  9/13/24 07:27   Iron Binding Capacity 240 - 430 ug/dL 349  9/13/24 07:27   Iron Sat Index 15 - 46 % 27  9/13/24 07:27   Lactate Dehydrogenase 0 - 250 U/L 168  9/13/24 07:27   Vitamin B1 Whole Blood Level 70 - 180 nmol/L 146  9/13/24 07:27   Vitamin B12 232 - 1,245 pg/mL 465  9/13/24 07:27   Vitamin B6 20.0 - 125.0 nmol/L 84.3  9/13/24 07:27   Sed Rate 0 - 15 mm/hr 8  9/13/24 07:27   (H): Data is abnormally high        10/2/2024    10:08 AM   PHQ Assesment Total Score(s)   PHQ-9 Score 15           10/2/2024    10:11 AM   YAZMIN-7 Results   YAZMIN 7 TOTAL SCORE 9 (mild anxiety)   YAZMIN-7 Total Score 9         10/2/2024    10:11 AM   PTSD Screen Score   Have you ever experienced this kind of event? No         10/2/2024    10:14 AM   PROMIS-29   PROMIS Physical Function T-Score 32 (moderate dysfunction)   PROMIS Anxiety T-Score 51 (within normal limits)   PROMIS Depression T-Score 52 (within normal limits)   PROMIS Fatigue T-Score 76 (severe)   PROMIS Sleep Disturbance T-Score 54 (within normal limits)   PROMIS Ability to Participate in Social Roles & Activities T-Score 29 (severe dysfunction)   PROMIS Pain Interference T-Score 76 (severe)   PROMIS Pain Intensity 8           Social History     Tobacco Use    Smoking status: Never    Smokeless tobacco: Never   Vaping Use    Vaping status: Never Used         Current Outpatient Medications:     buPROPion (WELLBUTRIN XL) 150 MG 24 hr tablet, Take 1 tablet (150 mg) by mouth every morning., Disp: 30 tablet, Rfl: 3    XYOSTED 75 MG/0.5ML SOAJ, Inject 75 mg as directed once a week, Disp: , Rfl:     escitalopram (LEXAPRO) 20 MG tablet, Take 1 tablet (20 mg) by mouth daily, Disp: 90 tablet, Rfl: 1      Review of Systems  Constitutional, HEENT, cardiovascular, pulmonary, gi  and gu systems are negative, except as otherwise noted.      Objective    Vitals - Patient Reported  Pain Score: Severe Pain (6)  Pain Loc:  (lower body and back)        Physical Exam   GENERAL: alert and no distress  EYES: Eyes grossly normal to inspection.  No discharge or erythema, or obvious scleral/conjunctival abnormalities.  RESP: No audible wheeze, cough, or visible cyanosis.    SKIN: Visible skin clear. No significant rash, abnormal pigmentation or lesions.  NEURO: Cranial nerves grossly intact.  Mentation and speech appropriate for age.  PSYCH: Appropriate affect, tone, and pace of words    Virtual Visit on 09/04/2024   Component Date Value Ref Range Status    CRP Inflammation 09/13/2024 8.40 (H)  <5.00 mg/L Final    Erythrocyte Sedimentation Rate 09/13/2024 8  0 - 15 mm/hr Final    Ferritin 09/13/2024 144  31 - 409 ng/mL Final    Iron 09/13/2024 94  61 - 157 ug/dL Final    Iron Binding Capacity 09/13/2024 349  240 - 430 ug/dL Final    Iron Sat Index 09/13/2024 27  15 - 46 % Final    Lactate Dehydrogenase 09/13/2024 168  0 - 250 U/L Final    Vitamin B1 Whole Blood Level 09/13/2024 146  70 - 180 nmol/L Final    INTERPRETIVE INFORMATION: Vitamin B1, Whole Blood    This assay measures the concentration of thiamine   diphosphate (TDP), the primary active form of vitamin B1.   Approximately 90 percent of vitamin B1 present in whole   blood is TDP. Thiamine and thiamine monophosphate, which   comprise the remaining 10 percent, are not measured.    This test was developed and its performance characteristics   determined by [facility]. It has not been cleared or   approved by the US Food and Drug Administration. This test   was performed in a CLIA certified laboratory and is   intended for clinical purposes.  Performed By: [facility]  [address] [place] [id number]  : Alejandro Doyle MD, PhD  CLIA Number: [id number]    Vitamin B12 09/13/2024 465  232 - 1,245 pg/mL  Final    Folic Acid 09/13/2024 8.4  4.6 - 34.8 ng/mL Final    Vitamin B6 09/13/2024 84.3  20.0 - 125.0 nmol/L Final    INTERPRETIVE INFORMATION: Vitamin B6 (Pyridoxal 5-Phosphate)    Pyridoxal 5'-phosphate measured in a specimen collected   following an 8-hour or overnight fast accurately indicates   vitamin B6 nutritional status. Non-fasting specimen   concentration reflects recent vitamin intake.    This test was developed and its performance characteristics   determined by [facility]. It has not been cleared or   approved by the US Food and Drug Administration. This test   was performed in a CLIA certified laboratory and is   intended for clinical purposes.  Performed By: [facility]  [address] [place] [id number]  : Alejandro Doyle MD, PhD  CLIA Number: [id number]         Video-Visit Details    Type of service:  Video Visit   Originating Location (pt. Location): Home    Distant Location (provider location):  Off-site  Platform used for Video Visit: [facility]  Signed Electronically by: Serena Coronel MD      PROMIS-29  Are you able to do chores such as vacuuming or yard work?  With much difficulty  Are you able to go up and down stairs at a normal pace?  With much difficulty  Are you able to go for a walk of at least 15 minutes?  With much difficulty  Are you able to run errands and shop?  With much difficulty  In the past 7 [place] felt fearful  Never  I found it hard to focus on anything other than my anxiety  Sometimes  My worries overwhelmed me  Never  I felt uneasy  Never  In the past 7 [place] felt worthless  Never  I felt helpless  Never  I felt depressed  Sometimes  I felt hopeless  Never  During the past 7 days / In the past 7 [place] feel fatigued  Very much  I have trouble starting things because I am tired  Very much  How run-down did you feel on average?  Very much  How fatigued were you on average?  Very much  In the past 7 daysMy sleep quality  was...  Poor  In the past 7 daysMy sleep was refreshing.  Somewhat  I had a problem with my sleep.  Quite a bit  I had difficulty falling asleep.  Not at all  I have trouble doing all of my regular leisure activities with others  Always  I have trouble doing all of the family activities that I want to do  Always  I have trouble doing all of my usual work (include work at home)  Always  I have trouble doing all of the activities with friends that I want to do  Always  In the past 7 daysHow much did pain interfere with your day to day activities?  Very much  How much did pain interfere with work around the home?  Very much  How much did pain interfere with your ability to participate in social activities?  Very much  How much did pain interfere with your household chores?  Very much  In the past 7 daysHow would you rate your pain on average?  8    PTSD SCREENER  Have you ever experienced this kind of event? NO    GAD7      The following questionnaire should only be answered by the patient. Are you the patient? Yes   YAZMIN-7(Used with permission: [employer] 2002)    Over the last two weeks, how often have you been bothered by the following problems?    1. Feeling nervous, anxious, or on edge Several days   2. Not being able to stop or control worrying Not at all   3. Worrying too much about different things Not at all   4. Trouble relaxing Nearly every day   5. Being so restless that it is hard to sit still Nearly every day   6. Becoming easily annoyed or irritable More than half the days   7. Feeling afraid, as if something awful might happen Not at all   If you checked off any problems on this questionnaire, how difficult have these problems made it for you to do your work, take care of things at home, or get along with other people? Very difficult   YAZMIN-7(Used with permission: [employer] 2002)        Answers submitted by the patient for this visit:  Patient Health Questionnaire (Submitted on 10/2/2024)  If you  checked off any problems, how difficult have these problems made it for you to do your work, take care of things at home, or get along with other people?: Extremely difficult  PHQ9 TOTAL SCORE: 15  Patient Health Questionnaire (G7) (Submitted on 10/2/2024)  YAZMIN 7 TOTAL SCORE: 9

## 2024-10-08 ENCOUNTER — MYC MEDICAL ADVICE (OUTPATIENT)
Dept: PHYSICAL MEDICINE AND REHAB | Facility: CLINIC | Age: 19
End: 2024-10-08
Payer: COMMERCIAL

## 2024-10-31 ENCOUNTER — OFFICE VISIT (OUTPATIENT)
Dept: FAMILY MEDICINE | Facility: CLINIC | Age: 19
End: 2024-10-31
Payer: COMMERCIAL

## 2024-10-31 VITALS
WEIGHT: 230 LBS | BODY MASS INDEX: 36.96 KG/M2 | HEIGHT: 66 IN | TEMPERATURE: 97.6 F | HEART RATE: 73 BPM | DIASTOLIC BLOOD PRESSURE: 84 MMHG | SYSTOLIC BLOOD PRESSURE: 126 MMHG | RESPIRATION RATE: 20 BRPM | OXYGEN SATURATION: 99 %

## 2024-10-31 DIAGNOSIS — U09.9 COVID-19 LONG HAULER: ICD-10-CM

## 2024-10-31 DIAGNOSIS — R29.898 WEAKNESS OF BOTH LOWER EXTREMITIES: Primary | ICD-10-CM

## 2024-10-31 DIAGNOSIS — Z23 ENCOUNTER FOR VACCINATION: ICD-10-CM

## 2024-10-31 DIAGNOSIS — F32.0 CURRENT MILD EPISODE OF MAJOR DEPRESSIVE DISORDER, UNSPECIFIED WHETHER RECURRENT (H): ICD-10-CM

## 2024-10-31 LAB
ALT SERPL W P-5'-P-CCNC: 14 U/L (ref 0–70)
AST SERPL W P-5'-P-CCNC: 18 U/L (ref 0–45)
CK SERPL-CCNC: 88 U/L (ref 39–308)
CRP SERPL-MCNC: 4.98 MG/L
LDH SERPL L TO P-CCNC: 182 U/L (ref 0–250)
PTH-INTACT SERPL-MCNC: 39 PG/ML (ref 15–65)
TSH SERPL DL<=0.005 MIU/L-ACNC: 1.36 UIU/ML (ref 0.3–4.2)

## 2024-10-31 PROCEDURE — 84443 ASSAY THYROID STIM HORMONE: CPT | Performed by: NURSE PRACTITIONER

## 2024-10-31 PROCEDURE — 90480 ADMN SARSCOV2 VAC 1/ONLY CMP: CPT | Performed by: NURSE PRACTITIONER

## 2024-10-31 PROCEDURE — 36415 COLL VENOUS BLD VENIPUNCTURE: CPT | Performed by: NURSE PRACTITIONER

## 2024-10-31 PROCEDURE — 82550 ASSAY OF CK (CPK): CPT | Performed by: NURSE PRACTITIONER

## 2024-10-31 PROCEDURE — 84450 TRANSFERASE (AST) (SGOT): CPT | Performed by: NURSE PRACTITIONER

## 2024-10-31 PROCEDURE — 83615 LACTATE (LD) (LDH) ENZYME: CPT | Performed by: NURSE PRACTITIONER

## 2024-10-31 PROCEDURE — 90471 IMMUNIZATION ADMIN: CPT | Performed by: NURSE PRACTITIONER

## 2024-10-31 PROCEDURE — 91320 SARSCV2 VAC 30MCG TRS-SUC IM: CPT | Performed by: NURSE PRACTITIONER

## 2024-10-31 PROCEDURE — 99214 OFFICE O/P EST MOD 30 MIN: CPT | Mod: 25 | Performed by: NURSE PRACTITIONER

## 2024-10-31 PROCEDURE — 86140 C-REACTIVE PROTEIN: CPT | Performed by: NURSE PRACTITIONER

## 2024-10-31 PROCEDURE — 90656 IIV3 VACC NO PRSV 0.5 ML IM: CPT | Performed by: NURSE PRACTITIONER

## 2024-10-31 PROCEDURE — 84460 ALANINE AMINO (ALT) (SGPT): CPT | Performed by: NURSE PRACTITIONER

## 2024-10-31 PROCEDURE — 83970 ASSAY OF PARATHORMONE: CPT | Performed by: NURSE PRACTITIONER

## 2024-10-31 RX ORDER — BUPROPION HYDROCHLORIDE 300 MG/1
300 TABLET ORAL EVERY MORNING
Qty: 30 TABLET | Refills: 0 | Status: SHIPPED | OUTPATIENT
Start: 2024-10-31

## 2024-10-31 ASSESSMENT — ANXIETY QUESTIONNAIRES
2. NOT BEING ABLE TO STOP OR CONTROL WORRYING: NOT AT ALL
GAD7 TOTAL SCORE: 2
IF YOU CHECKED OFF ANY PROBLEMS ON THIS QUESTIONNAIRE, HOW DIFFICULT HAVE THESE PROBLEMS MADE IT FOR YOU TO DO YOUR WORK, TAKE CARE OF THINGS AT HOME, OR GET ALONG WITH OTHER PEOPLE: NOT DIFFICULT AT ALL
1. FEELING NERVOUS, ANXIOUS, OR ON EDGE: NOT AT ALL
6. BECOMING EASILY ANNOYED OR IRRITABLE: SEVERAL DAYS
GAD7 TOTAL SCORE: 2
5. BEING SO RESTLESS THAT IT IS HARD TO SIT STILL: NOT AT ALL
7. FEELING AFRAID AS IF SOMETHING AWFUL MIGHT HAPPEN: NOT AT ALL
3. WORRYING TOO MUCH ABOUT DIFFERENT THINGS: NOT AT ALL

## 2024-10-31 ASSESSMENT — PATIENT HEALTH QUESTIONNAIRE - PHQ9
5. POOR APPETITE OR OVEREATING: SEVERAL DAYS
SUM OF ALL RESPONSES TO PHQ QUESTIONS 1-9: 8

## 2024-10-31 ASSESSMENT — ENCOUNTER SYMPTOMS: LEG PAIN: 1

## 2024-10-31 ASSESSMENT — PAIN SCALES - GENERAL: PAINLEVEL_OUTOF10: MODERATE PAIN (5)

## 2024-10-31 NOTE — PROGRESS NOTES
Assessment & Plan     Weakness of both lower extremities  Progressively worse weakness with activity, lab work ordered today which was normal.  Patient contacted after the visit with results on MyChart.   I have ordered EMG testing and neurology referral for further evaluation due to worsening symptoms.  - CK total; Future  - Lactate Dehydrogenase; Future  - ALT; Future  - AST; Future  - TSH with free T4 reflex; Future  - Parathyroid Hormone Intact; Future  - CK total  - Lactate Dehydrogenase  - ALT  - AST  - TSH with free T4 reflex  - Parathyroid Hormone Intact  - Neurology Referral  - EMG    COVID-19 long hauler  Will be following up with PM&R in a couple weeks; started on guanfacine in the last week.   - CRP inflammation    Current mild episode of major depressive disorder, unspecified whether recurrent (H)  PHQ-9 score reevaluated, instructed patient to increase dose of Wellbutrin to 300mg daily.  - buPROPion (WELLBUTRIN XL) 300 MG 24 hr tablet; Take 1 tablet (300 mg) by mouth every morning.    Encounter for vaccination  - INFLUENZA VACCINE,SPLIT VIRUS,TRIVALENT,PF(FLUZONE)  - COVID-19 12+ (PFIZER)  - REVIEW OF HEALTH MAINTENANCE PROTOCOL ORDERS      Jessa Garcia is a 19 year old, presenting for the following health issues:  Leg Pain (Following up leg pain after covid long haul )        10/31/2024    10:40 AM   Additional Questions   Roomed by Celestina HANSEN   Accompanied by self         10/31/2024    10:40 AM   Patient Reported Additional Medications   Patient reports taking the following new medications no new meds     History of Present Illness       Reason for visit:  Lpng covid follow up and worsening symptoms    He eats 2-3 servings of fruits and vegetables daily.He consumes 1 sweetened beverage(s) daily.He exercises with enough effort to increase his heart rate 9 or less minutes per day.  He exercises with enough effort to increase his heart rate 3 or less days per week. He is missing 4 dose(s) of  medications per week.  He is not taking prescribed medications regularly due to remembering to take.     Pain History:  When did you first notice your pain? About a year   Have you seen anyone else for your pain? Yes - long haul covid specialist   How has your pain affected your ability to work? Can work part time with limitations   What type of work do you or did you do? [employer]   Where in your body do you have pain? Musculoskeletal problem/pain  Onset/Duration: about a year   Description  Location: leg - bilateral  Joint Swelling: No  Redness: No  Pain: YES  Warmth: No  Numbness and soreness when the weakness occurs  Intensity:  mild  Progression of Symptoms:  worsening  Accompanying signs and symptoms:   Fevers: No  Numbness/tingling/weakness: YES  History  Trauma to the area: no but did have covid   Recent illness:  No  Previous similar problem: No  Previous evaluation:  YES  Precipitating or alleviating factors:  Aggravating factors include: standing, walking, overusing,  and cold or damp weather  Therapies tried and outcome: acetaminophen  Guanfacine was started a week ago.  Started Wellbutrin in September.  Symptoms worsened about 1 month or so ago.    Patient complains of bilateral leg weakness which has been present over a year, but has been getting progressively worse in the last month. The weakness also coincides with bilateral numbness and mild soreness, at times R is worse than L. 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 Denies any back pain.        Review of Systems  CONSTITUTIONAL: NEGATIVE for fever, chills, change in weight  MUSCULOSKELETAL: POSITIVE  for muscle weakness bilaterally with activity, worsening over last month and bilateral leg soreness  NEURO: POSITIVE for numbness in bilateral legs  "when he has the weakness  PSYCHIATRIC: NEGATIVE for changes in mood or affect      Objective    /84   Pulse 73   Temp 97.6  F (36.4  C) (Tympanic)   Resp 20   Ht 1.676 m (5' 6\")   Wt 104.3 kg (230 lb)   SpO2 99%   BMI 37.12 kg/m    Body mass index is 37.12 kg/m .  Physical Exam   GENERAL: alert and no distress  MS: no gross musculoskeletal defects noted, no edema  MS: RUE exam shows normal strength and muscle mass, LUE exam shows normal strength and muscle mass, RLE exam shows slight weakness, LLE exam shows slight weakness, and spine exam shows no scoliosis, ROM is normal, and straight leg raises are normal, reflex's were normal in the lower extremities as well.  NEURO: Normal strength and tone, mentation intact and speech normal  PSYCH:  Normal affect and insight    Signed Electronically by: Kellee Lawler NP Student    I, Vani Leiva, was present with the NP student who participated in the service and in the documentationof the note.   I have verified the history and personally performed the physical exam and medical decision making.  I agree with the assessment and plan of care as documented in the note.     Vani Leiva NP on 10/31/2024 at 2:12 PM      "

## 2024-10-31 NOTE — PATIENT INSTRUCTIONS
Addended by: Dewayne Cali on: 3/18/2022 01:54 PM     Modules accepted: Orders Labs today are normal.  I ordered EMG testing and neurology consult for further evaluation.  I increased your Wellbutrin to 300 mg daily.  Follow-up in 1 month with virtual visit for recheck.  Continue to follow-up with COVID clinic for your long-hauler symptoms.

## 2024-11-05 ENCOUNTER — HOSPITAL ENCOUNTER (EMERGENCY)
Facility: CLINIC | Age: 19
Discharge: HOME | End: 2024-11-05
Attending: STUDENT IN AN ORGANIZED HEALTH CARE EDUCATION/TRAINING PROGRAM | Admitting: STUDENT IN AN ORGANIZED HEALTH CARE EDUCATION/TRAINING PROGRAM
Payer: COMMERCIAL

## 2024-11-05 VITALS
DIASTOLIC BLOOD PRESSURE: 75 MMHG | HEART RATE: 80 BPM | HEIGHT: 66 IN | SYSTOLIC BLOOD PRESSURE: 122 MMHG | TEMPERATURE: 97.7 F | OXYGEN SATURATION: 98 % | WEIGHT: 220 LBS | BODY MASS INDEX: 35.36 KG/M2 | RESPIRATION RATE: 18 BRPM

## 2024-11-05 DIAGNOSIS — R53.1 WEAKNESS GENERALIZED: ICD-10-CM

## 2024-11-05 DIAGNOSIS — R25.1 SHAKINESS: ICD-10-CM

## 2024-11-05 PROCEDURE — 250N000011 HC RX IP 250 OP 636: Performed by: STUDENT IN AN ORGANIZED HEALTH CARE EDUCATION/TRAINING PROGRAM

## 2024-11-05 PROCEDURE — 99284 EMERGENCY DEPT VISIT MOD MDM: CPT | Performed by: STUDENT IN AN ORGANIZED HEALTH CARE EDUCATION/TRAINING PROGRAM

## 2024-11-05 PROCEDURE — 250N000013 HC RX MED GY IP 250 OP 250 PS 637: Performed by: STUDENT IN AN ORGANIZED HEALTH CARE EDUCATION/TRAINING PROGRAM

## 2024-11-05 PROCEDURE — 96372 THER/PROPH/DIAG INJ SC/IM: CPT | Performed by: STUDENT IN AN ORGANIZED HEALTH CARE EDUCATION/TRAINING PROGRAM

## 2024-11-05 PROCEDURE — 93005 ELECTROCARDIOGRAM TRACING: CPT | Performed by: STUDENT IN AN ORGANIZED HEALTH CARE EDUCATION/TRAINING PROGRAM

## 2024-11-05 PROCEDURE — 93010 ELECTROCARDIOGRAM REPORT: CPT | Performed by: STUDENT IN AN ORGANIZED HEALTH CARE EDUCATION/TRAINING PROGRAM

## 2024-11-05 RX ORDER — LORAZEPAM 1 MG/1
1 TABLET ORAL ONCE
Status: COMPLETED | OUTPATIENT
Start: 2024-11-05 | End: 2024-11-05

## 2024-11-05 RX ORDER — KETOROLAC TROMETHAMINE 30 MG/ML
30 INJECTION, SOLUTION INTRAMUSCULAR; INTRAVENOUS ONCE
Status: COMPLETED | OUTPATIENT
Start: 2024-11-05 | End: 2024-11-05

## 2024-11-05 RX ADMIN — KETOROLAC TROMETHAMINE 30 MG: 30 INJECTION, SOLUTION INTRAMUSCULAR at 04:04

## 2024-11-05 RX ADMIN — LORAZEPAM 1 MG: 1 TABLET ORAL at 03:59

## 2024-11-05 ASSESSMENT — COLUMBIA-SUICIDE SEVERITY RATING SCALE - C-SSRS
1. IN THE PAST MONTH, HAVE YOU WISHED YOU WERE DEAD OR WISHED YOU COULD GO TO SLEEP AND NOT WAKE UP?: NO
2. HAVE YOU ACTUALLY HAD ANY THOUGHTS OF KILLING YOURSELF IN THE PAST MONTH?: NO
6. HAVE YOU EVER DONE ANYTHING, STARTED TO DO ANYTHING, OR PREPARED TO DO ANYTHING TO END YOUR LIFE?: NO

## 2024-11-05 ASSESSMENT — ACTIVITIES OF DAILY LIVING (ADL): ADLS_ACUITY_SCORE: 0

## 2024-11-05 NOTE — ED TRIAGE NOTES
"Pt presents with chest tightness, generalized body aches, and \"shakiness\" that has ongoing off and on for over year. Per pt he was told at his last visit he may have \"long Covid\".      Triage Assessment (Adult)       Row Name 11/05/24 0334          Triage Assessment    Airway WDL WDL        Respiratory WDL    Respiratory WDL X;WDL        Skin Circulation/Temperature WDL    Skin Circulation/Temperature WDL WDL        Cardiac WDL    Cardiac WDL X;chest pain        Chest Pain Assessment    Chest Pain Location epigastric;midsternal     Precipitating Factors at rest     Chest Pain Intervention cardiac biomarkers drawn;cardiac monitoring continued;cardiac monitor placed;12-lead ECG obtained;activity minimized        Peripheral/Neurovascular WDL    Peripheral Neurovascular WDL WDL        Cognitive/Neuro/Behavioral WDL    Cognitive/Neuro/Behavioral WDL WDL                     "

## 2024-11-05 NOTE — DISCHARGE INSTRUCTIONS
Your EKG was reassuring.  Your symptoms improved with medications.  I am not exactly sure what is causing your symptoms.  I do not think it is anything emergently dangerous.  Make sure you follow-up with your regular doctor and neurology as scheduled.  Return with new or concerning symptoms.

## 2024-11-05 NOTE — ED PROVIDER NOTES
History     Chief Complaint   Patient presents with    Chest Pain    Shaking    Generalized Body Aches     HPI  Jose Washington is a 19 year old male who has anxiety, who presents for evaluation of weakness and shakiness.  Patient states that he has been having the symptoms on and off for the last year.  He has been following with his primary doctor and he states that it is thought that it may be due to long COVID.  Patient saw his primary care provider on 10/31 and that visit was reviewed.  He has had extensive laboratory workup that is all been negative.  Has had negative CK, LFTs, TSH, parathyroid hormone, CK, LDH.  CRP slightly elevated at 8.  Patient was referred to neurology.  He presents today with his girlfriend.  He endorses having whole body pain.  When I tried to get him to localize the pain he just kept saying it was his entire body.  His legs are shaking.  He also endorses chest tightness.  He states that he feels like his entire body is weak.  He denies any new medications.  Denies fever.  No vomiting.  No abdominal pain.  Denies alcohol or drugs.    Allergies:  Allergies   Allergen Reactions    Amoxicillin        Problem List:    Patient Active Problem List    Diagnosis Date Noted    Anxiety 04/10/2024     Priority: Medium    Gastroesophageal reflux disease with esophagitis, unspecified whether hemorrhage 04/10/2024     Priority: Medium    Transgender man on hormone therapy 04/10/2024     Priority: Medium     Dr. Carlton with [facility] in endocrnology managing testosterone and gender transition. surgery in past to remove breasts.            Past Medical History:    No past medical history on file.    Past Surgical History:    No past surgical history on file.    Family History:    No family history on file.    Social History:  Marital Status:  Single [1]  Social History     Tobacco Use    Smoking status: Never    Smokeless tobacco: Never   Vaping Use    Vaping status: Never Used   Substance Use  "Topics    Drug use: Never        Medications:    buPROPion (WELLBUTRIN XL) 300 MG 24 hr tablet  guanFACINE (TENEX) 1 MG tablet  XYOSTED 75 MG/0.5ML SOAJ          Review of Systems  See HPI  Physical Exam   BP: (!) 142/87  Pulse: 119  Temp: 97.7  F (36.5  C)  Resp: 18  Height: 167.6 cm (5' 6\")  Weight: 99.8 kg (220 lb)  SpO2: 99 %      Physical Exam  /75   Pulse 80   Temp 97.7  F (36.5  C) (Oral)   Resp 18   Ht 1.676 m (5' 6\")   Wt 99.8 kg (220 lb)   SpO2 98%   BMI 35.51 kg/m    General: alert, interactive, in no apparent distress  Head: atraumatic  Nose: no rhinorrhea or epistaxis  Ears: no external auditory canal discharge or bleeding.    Eyes: Sclera nonicteric. Conjunctiva noninjected. PERRL, EOMI  Mouth: no tonsillar erythema, edema, or exudate.  Moist mucous membranes  Neck: supple, moving spontaneously no midline cervical tenderness  Lungs: No increased work of breathing.  Clear to auscultation bilaterally.  CV: Tachycardic, peripheral pulses palpable and symmetric  Abdomen: soft, nt, nd, no guarding or rebound.   Extremities: Warm and well-perfused.  Legs are spontaneously shaking intermittently  Skin: no rash or diaphoresis  Neuro: CN II-XII grossly intact, strength 5/5 in UE and LEs bilaterally, sensation intact to light touch in UE and LEs bilaterally;     ED Course        Procedures              EKG Interpretation:      Interpreted by Alejandro Hargrove MD  Time reviewed: 4:04 AM    Symptoms at time of EKG: Whole body weakness  Rhythm: normal sinus   Rate: 103  Axis: normal  Ectopy: none  Conduction: normal  ST Segments/ T Waves: No ST-T wave changes  Q Waves: none  Comparison to prior: Unchanged    Clinical Impression: normal EKG      Critical Care time:  none             No results found for this or any previous visit (from the past 24 hours).    Medications   LORazepam (ATIVAN) tablet 1 mg (1 mg Oral $Given 11/5/24 0359)   ketorolac (TORADOL) injection 30 mg (30 mg Intramuscular $Given 11/5/24 " 0404)       Assessments & Plan (with Medical Decision Making)     I have reviewed the nursing notes.    I have reviewed the findings, diagnosis, plan and need for follow up with the patient.          Medical Decision Making  Jose Washington is a 19 year old male who has anxiety, who presents for evaluation of weakness and shakiness.  Vital signs reviewed and notable for tachycardia and hypertension.  Patient has a nonlocalizable exam.  History and exam does not suggest ACS or other emergent cardiothoracic etiology.  Has had reassuring workups in the clinic.  Reassured against a emergent pathology since his symptoms have been intermittent for over a year.  Possible that it could be related to anxiety.  Could be due to the long COVID that he has been dealing with, but I cannot say for certain.  His EKG is reassuring.  Patient was given.  When I reassessed him, his tachycardia and blood pressure did improve.  He reported feeling much better.  Unclear what is causing his symptoms, but low suspicion for emergent pathology.  Outpatient management is appropriate at this time.  Will have him follow-up with his primary doctor.        Discharge Medication List as of 11/5/2024  4:56 AM          Final diagnoses:   Shakiness   Weakness generalized       11/5/2024   [facility] EMERGENCY DEPT       Alejandro Hargrove MD  11/05/24 1651

## 2024-12-01 ENCOUNTER — HEALTH MAINTENANCE LETTER (OUTPATIENT)
Age: 19
End: 2024-12-01

## 2024-12-18 ENCOUNTER — PATIENT OUTREACH (OUTPATIENT)
Dept: CARE COORDINATION | Facility: CLINIC | Age: 19
End: 2024-12-18
Payer: COMMERCIAL

## 2024-12-26 NOTE — TELEPHONE ENCOUNTER
RECORDS RECEIVED FROM: Internal    REASON FOR VISIT: Weakness of both lower extremities   PROVIDER: Pipo Akhtar DO   DATE OF APPT: 1/30/25 @ 9:00 am    NOTES (FOR ALL VISITS) STATUS DETAILS   OFFICE NOTE from referring provider Internal 10/31/24, 8/19/24 Vani Leiva NP @[facility]     DISCHARGE REPORT from the ER Internal 11/5/24 Alejandro Hargrove MD @[facility] ED     MEDICATION LIST Internal    IMAGING  (FOR ALL VISITS)     MRI (HEAD, NECK, SPINE) N/A    CT (HEAD, NECK, SPINE) N/A

## 2025-01-30 ENCOUNTER — PRE VISIT (OUTPATIENT)
Dept: NEUROLOGY | Facility: CLINIC | Age: 20
End: 2025-01-30

## 2025-01-30 ENCOUNTER — VIRTUAL VISIT (OUTPATIENT)
Dept: NEUROLOGY | Facility: CLINIC | Age: 20
End: 2025-01-30
Attending: NURSE PRACTITIONER
Payer: COMMERCIAL

## 2025-01-30 VITALS — HEIGHT: 66 IN | BODY MASS INDEX: 35.36 KG/M2 | WEIGHT: 220 LBS

## 2025-01-30 DIAGNOSIS — R29.898 WEAKNESS OF BOTH LOWER EXTREMITIES: ICD-10-CM

## 2025-01-30 ASSESSMENT — PAIN SCALES - GENERAL: PAINLEVEL_OUTOF10: NO PAIN (0)

## 2025-01-30 NOTE — NURSING NOTE
Current patient location:  in [place]    Is the patient currently in the state of [place]? YES    Visit mode: VIDEO    If the visit is dropped, the patient can be reconnected by:VIDEO VISIT: Text to cell phone:   Telephone Information:   Mobile [phone]       Will anyone else be joining the visit? NO  (If patient encounters technical issues they should call [phone])    Are changes needed to the allergy or medication list? No    Are refills needed on medications prescribed by this physician? NO    Rooming Documentation:  Questionnaire(s) not pre-assigned    Reason for visit: Consult    Tatiana MUHAMMAD

## 2025-01-30 NOTE — PROGRESS NOTES
[facility] Neurology Consultation    Jose Washington MRN# [id number]   Age: 20 year old YOB: 2005     Requesting physician: Vani Hart     Reason for Consultation: weakness in body      History of Presenting Symptoms:   Jose Washington is a 20 year old male who presents today for evaluation of weakness in body.  The patient has a pertinent medical history of GERD, YAZMIN, Post-COVID19 syndrome (see 10/2/2024 notes with PM&R), and is a transgender man on hormone therapy.      Issues of tachycardia, fatigue, dyspnea, and brain fog are noted with PM&R visit 9/4/2024 and 10/2/2024.  Symptoms were present since 3/222.  Issues of mental health, and long COVID were through to be contributing to symptoms.  Mental health was to manage issues of depression and anxiety, appointment scheduled for 4/2025, w/ Wellbutrin dosed at 300 mg every day.  The patient presented to an ED 11/5/2024 for issues of shakiness, chest pain, body aches (whole body pain) Exam was non-localizable, testing was negative (EKG, serum studies) and outpatient management of mental health and general symptoms was recommended.    Today, the patient confirms having brain fog, body aches, shaking.  Issues of weakness are also present. All symptoms have been present for two years.  They came on in a mild way after COVID infection (had 4-5 times).  They felt they were tired in a quicker way when doing day to day activities.  Now, there are issues of just having difficulty with walking, getting up from bed, or just being at work for prolonged periods of time.  They indicate having tightness in calves, and issues of generalized shaking. Typically shaking is only in the legs. These are said to be most present in the AM to a greater degree, but are present to a lesser degree through the day.  Shaking occurs after long days or during use, but never with rest.  Shaking is never one sided, or stereotyped.     There is no clear  "issues of swallowing that is consistent. They feel they have to slow down to eat and take smaller bites.  There is no choking events with liquids.  There is no dysarthria. There is no diplopia. There is no clear neck flexor/extensor weakness. 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    Prior to COVID, no symptoms of weakness were present.  There is no LOC.  There is no family history of seizure. There is no personal history of seizure.  No meningitis or encephalitis is present. There is no fecal or urinary incontinence.  There are no atypical eating habits or dietary restrictions. They do not drink from well water.  They have an energy drink here and there but otherwise do not use supplements.  There are no ongoing rashes.  There is no family history of neuromuscular or muscle disorders on their mother's, but they are not aware of their father's side.      Medications:   Wellbutrin 300 mg QAM  Guanfacine 1 mg at bedtime  Xyosted 75 mg Qweek     Physical Exam:   Vitals: Ht 1.676 m (5' 6\")   Wt 99.8 kg (220 lb)   BMI 35.51 kg/m     General: Seated comfortably in no acute distress.  Neurologic:     Mental Status: Fully alert, attentive and oriented. Speech clear and fluent, no paraphasic errors.     Cranial Nerves: EOMI with normal smooth pursuit. Facial movements symmetric. Hearing not formally tested but intact to conversation.  No dysarthria.     Motor: No tremors or other abnormal movements observed.          Data: Pertinent prior to visit   Imaging:  None pertinent    Laboratory:  10/31/2024:  Normal ~ TSH, PTH, AST/ALT/LDH, CK, CRP    9/13/2024:  Abnormal ~ CRP (8.40)  Normal ~ B6, B1, B12,  Folate, LDH, Iron and iron binding, ferritin, ESR    8/19/2024:  Normal ~ Lyme rebecca, mononucleosis screen, TSH, CBC, CMP         Assessment and Plan:   Assessment:  - Post-COVID syndrome  - Concern for separate right carpal tunnel by " description    The patient describes slow onset fatigability, concentration issues, whole body aches/pain, with shaking occurring in fatigable muscles.  Fatigue seems to be larger motor groups, and not leading to dysphagia/dysarthria, diplopia, or neck flex/ext weakness, nor any drop attacks.  There is some report of right  weakness, which may correlate to CTS given clinical description and work related trigger.  I suspect given onset came after COVID, and worsened each time after subsequent COVID infections, that these symptoms fall in line mostly with Post-COVID syndrome.  It is reassuring that there was no clear neurological deficits occurring in day to week long fashions leading to facial droop, dysarthria, diplopia, ataxia, focal weakness/sensory loss, as this may be reason for imaging the central nervous system.  It is also reassuring the onset of symptoms first occurred after a viral illness, and not prior in a slow progressive fashion, as it seems unlikely a genetic issue of metabolism or muscle function is present (especially given normal serum studies so far).  The lack of radiating pain, sensory loss, or urinary/fecal incontinence is reassuring for localized radiculopathy, neuropathy or cord compression/cauda equina and imaging of the lumbar spine is not necessarily needed. To better understand if a neuromuscular condition is present, I do think serum studies for myasthenia could be done, as this type of disease may appear similar in nature (although I would expect small motor groups to be impacted).    Plan:  - Myasthenia panel  - Agree with EMG (right hand, one leg)  - If testing is normal, then continue with Post-COVID clinic as you are    Follow up in Neurology clinic pending results of testing, or should new concerns arise.    PHI Akhtar D.O.   of Neurology    Total time today (62 min) in this patient encounter was spent on pre-charting, counseling and/or coordination  of care.  The patient is in agreement with this plan and has no further questions.

## 2025-01-30 NOTE — LETTER
1/30/2025       RE: Jose Washington  [address]  [place] [address]     Dear Colleague,    Thank you for referring your patient, Jose Washington, to the [facility] [place] at [facility]. Please see a copy of my visit note below.    [facility] Neurology Consultation    Jose Washington MRN# [id number]   Age: 20 year old YOB: 2005     Requesting physician: Vani Hart     Reason for Consultation: weakness in body      History of Presenting Symptoms:   Jose Washington is a 20 year old male who presents today for evaluation of weakness in body.  The patient has a pertinent medical history of GERD, YAZMIN, Post-COVID19 syndrome (see 10/2/2024 notes with PM&R), and is a transgender man on hormone therapy.      Issues of tachycardia, fatigue, dyspnea, and brain fog are noted with PM&R visit 9/4/2024 and 10/2/2024.  Symptoms were present since 3/222.  Issues of mental health, and long COVID were through to be contributing to symptoms.  Mental health was to manage issues of depression and anxiety, appointment scheduled for 4/2025, w/ Wellbutrin dosed at 300 mg every day.  The patient presented to an ED 11/5/2024 for issues of shakiness, chest pain, body aches (whole body pain) Exam was non-localizable, testing was negative (EKG, serum studies) and outpatient management of mental health and general symptoms was recommended.    Today, the patient confirms having brain fog, body aches, shaking.  Issues of weakness are also present. All symptoms have been present for two years.  They came on in a mild way after COVID infection (had 4-5 times).  They felt they were tired in a quicker way when doing day to day activities.  Now, there are issues of just having difficulty with walking, getting up from bed, or just being at work for prolonged periods of time.  They indicate having tightness in calves, and issues of  "generalized shaking. Typically shaking is only in the legs. These are said to be most present in the AM to a greater degree, but are present to a lesser degree through the day.  Shaking occurs after long days or during use, but never with rest.  Shaking is never one sided, or stereotyped.     There is no clear issues of swallowing that is consistent. They feel they have to slow down to eat and take smaller bites.  There is no choking events with liquids.  There is no dysarthria. There is no diplopia. There is no clear neck flexor/extensor weakness. 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    Prior to COVID, no symptoms of weakness were present.  There is no LOC.  There is no family history of seizure. There is no personal history of seizure.  No meningitis or encephalitis is present. There is no fecal or urinary incontinence.  There are no atypical eating habits or dietary restrictions. They do not drink from well water.  They have an energy drink here and there but otherwise do not use supplements.  There are no ongoing rashes.  There is no family history of neuromuscular or muscle disorders on their mother's, but they are not aware of their father's side.      Medications:   Wellbutrin 300 mg QAM  Guanfacine 1 mg at bedtime  Xyosted 75 mg Qweek     Physical Exam:   Vitals: Ht 1.676 m (5' 6\")   Wt 99.8 kg (220 lb)   BMI 35.51 kg/m     General: Seated comfortably in no acute distress.  Neurologic:     Mental Status: Fully alert, attentive and oriented. Speech clear and fluent, no paraphasic errors.     Cranial Nerves: EOMI with normal smooth pursuit. Facial movements symmetric. Hearing not formally tested but intact to conversation.  No dysarthria.     Motor: No tremors or other abnormal movements observed.          Data: Pertinent prior to visit   Imaging:  None pertinent    Laboratory:  10/31/2024:  Normal ~ TSH, PTH, " AST/ALT/LDH, CK, CRP    9/13/2024:  Abnormal ~ CRP (8.40)  Normal ~ B6, B1, B12,  Folate, LDH, Iron and iron binding, ferritin, ESR    8/19/2024:  Normal ~ Lyme rebecca, mononucleosis screen, TSH, CBC, CMP         Assessment and Plan:   Assessment:  - Post-COVID syndrome  - Concern for separate right carpal tunnel by description    The patient describes slow onset fatigability, concentration issues, whole body aches/pain, with shaking occurring in fatigable muscles.  Fatigue seems to be larger motor groups, and not leading to dysphagia/dysarthria, diplopia, or neck flex/ext weakness, nor any drop attacks.  There is some report of right  weakness, which may correlate to CTS given clinical description and work related trigger.  I suspect given onset came after COVID, and worsened each time after subsequent COVID infections, that these symptoms fall in line mostly with Post-COVID syndrome.  It is reassuring that there was no clear neurological deficits occurring in day to week long fashions leading to facial droop, dysarthria, diplopia, ataxia, focal weakness/sensory loss, as this may be reason for imaging the central nervous system.  It is also reassuring the onset of symptoms first occurred after a viral illness, and not prior in a slow progressive fashion, as it seems unlikely a genetic issue of metabolism or muscle function is present (especially given normal serum studies so far).  The lack of radiating pain, sensory loss, or urinary/fecal incontinence is reassuring for localized radiculopathy, neuropathy or cord compression/cauda equina and imaging of the lumbar spine is not necessarily needed. To better understand if a neuromuscular condition is present, I do think serum studies for myasthenia could be done, as this type of disease may appear similar in nature (although I would expect small motor groups to be impacted).    Plan:  - Myasthenia panel  - Agree with EMG (right hand, one leg)  - If testing is normal,  then continue with Post-COVID clinic as you are    Follow up in Neurology clinic pending results of testing, or should new concerns arise.    PHI Akhtar D.O.   of Neurology    Total time today (62 min) in this patient encounter was spent on pre-charting, counseling and/or coordination of care.  The patient is in agreement with this plan and has no further questions.      Virtual Visit Details    Type of service:  Video Visit     Originating Location (pt. Location): Home    Distant Location (provider location):  On-site  Platform used for Video Visit: AmWell      Again, thank you for allowing me to participate in the care of your patient.      Sincerely,    Pipo Akhtar, DO

## 2025-01-30 NOTE — PROGRESS NOTES
Virtual Visit Details    Type of service:  Video Visit     Originating Location (pt. Location): Home    Distant Location (provider location):  On-site  Platform used for Video Visit: Jose

## 2025-01-30 NOTE — PATIENT INSTRUCTIONS
Continue with plans for obtaining an EMG. I will add on testing for neuromuscular conditions that can lead to fatigable weakness.  If either test is positive, we should meet again to discuss next steps in management.    Otherwise, your clinical history and prior testing is reassuring for lack of brain/spine injury, muscle disorders from a variety of sources, or clear nerve injury.  I do no think imaging would be beneficial or high-yield at this time, but if new symptoms emerged that opinion could very well change.  Continue with plans to work with your post-COVID syndrome provider for symptomatic treatment, and follow up with your primary care provider for general health concerns.